# Patient Record
Sex: FEMALE
[De-identification: names, ages, dates, MRNs, and addresses within clinical notes are randomized per-mention and may not be internally consistent; named-entity substitution may affect disease eponyms.]

---

## 2018-03-30 ENCOUNTER — HOSPITAL ENCOUNTER (INPATIENT)
Dept: HOSPITAL 97 - ER | Age: 83
LOS: 5 days | Discharge: NURSING, REHAB OR LONG-TERM CARE | DRG: 683 | End: 2018-04-04
Attending: FAMILY MEDICINE | Admitting: FAMILY MEDICINE
Payer: COMMERCIAL

## 2018-03-30 VITALS — BODY MASS INDEX: 19.1 KG/M2

## 2018-03-30 DIAGNOSIS — K57.10: ICD-10-CM

## 2018-03-30 DIAGNOSIS — E87.2: ICD-10-CM

## 2018-03-30 DIAGNOSIS — K29.70: ICD-10-CM

## 2018-03-30 DIAGNOSIS — K21.9: ICD-10-CM

## 2018-03-30 DIAGNOSIS — E87.6: ICD-10-CM

## 2018-03-30 DIAGNOSIS — K29.80: ICD-10-CM

## 2018-03-30 DIAGNOSIS — N39.0: ICD-10-CM

## 2018-03-30 DIAGNOSIS — E46: ICD-10-CM

## 2018-03-30 DIAGNOSIS — D62: ICD-10-CM

## 2018-03-30 DIAGNOSIS — Z85.3: ICD-10-CM

## 2018-03-30 DIAGNOSIS — K20.9: ICD-10-CM

## 2018-03-30 DIAGNOSIS — R62.7: ICD-10-CM

## 2018-03-30 DIAGNOSIS — F41.9: ICD-10-CM

## 2018-03-30 DIAGNOSIS — K26.9: ICD-10-CM

## 2018-03-30 DIAGNOSIS — E03.9: ICD-10-CM

## 2018-03-30 DIAGNOSIS — E83.42: ICD-10-CM

## 2018-03-30 DIAGNOSIS — R04.0: ICD-10-CM

## 2018-03-30 DIAGNOSIS — I10: ICD-10-CM

## 2018-03-30 DIAGNOSIS — R55: ICD-10-CM

## 2018-03-30 DIAGNOSIS — K52.9: ICD-10-CM

## 2018-03-30 DIAGNOSIS — N17.9: Primary | ICD-10-CM

## 2018-03-30 LAB
ALBUMIN SERPL BCP-MCNC: 3.7 G/DL (ref 3.2–5.5)
ALP SERPL-CCNC: 101 IU/L (ref 42–121)
ALT SERPL W P-5'-P-CCNC: 12 IU/L (ref 10–60)
AST SERPL W P-5'-P-CCNC: 22 IU/L (ref 10–42)
BUN BLD-MCNC: 37 MG/DL (ref 6–20)
GLUCOSE SERPLBLD-MCNC: 157 MG/DL (ref 65–120)
HCT VFR BLD CALC: 31.9 % (ref 36–45)
INR BLD: 1.08
LYMPHOCYTES # SPEC AUTO: 3.9 K/UL (ref 0.7–4.9)
MAGNESIUM SERPL-MCNC: 2.3 MG/DL (ref 1.8–2.5)
MCH RBC QN AUTO: 27.3 PG (ref 27–35)
MCV RBC: 81.9 FL (ref 80–100)
PMV BLD: 7.9 FL (ref 7.6–11.3)
POTASSIUM SERPL-SCNC: 4.4 MEQ/L (ref 3.6–5)
RBC # BLD: 3.9 M/UL (ref 3.86–4.86)
UA COMPLETE W REFLEX CULTURE PNL UR: (no result)
UA DIPSTICK W REFLEX MICRO PNL UR: (no result)

## 2018-03-30 PROCEDURE — 94760 N-INVAS EAR/PLS OXIMETRY 1: CPT

## 2018-03-30 PROCEDURE — 86901 BLOOD TYPING SEROLOGIC RH(D): CPT

## 2018-03-30 PROCEDURE — 87186 SC STD MICRODIL/AGAR DIL: CPT

## 2018-03-30 PROCEDURE — 71260 CT THORAX DX C+: CPT

## 2018-03-30 PROCEDURE — 82962 GLUCOSE BLOOD TEST: CPT

## 2018-03-30 PROCEDURE — 81003 URINALYSIS AUTO W/O SCOPE: CPT

## 2018-03-30 PROCEDURE — 86900 BLOOD TYPING SEROLOGIC ABO: CPT

## 2018-03-30 PROCEDURE — 85025 COMPLETE CBC W/AUTO DIFF WBC: CPT

## 2018-03-30 PROCEDURE — 80053 COMPREHEN METABOLIC PANEL: CPT

## 2018-03-30 PROCEDURE — 97163 PT EVAL HIGH COMPLEX 45 MIN: CPT

## 2018-03-30 PROCEDURE — 70450 CT HEAD/BRAIN W/O DYE: CPT

## 2018-03-30 PROCEDURE — 85610 PROTHROMBIN TIME: CPT

## 2018-03-30 PROCEDURE — 87088 URINE BACTERIA CULTURE: CPT

## 2018-03-30 PROCEDURE — 87086 URINE CULTURE/COLONY COUNT: CPT

## 2018-03-30 PROCEDURE — 80048 BASIC METABOLIC PNL TOTAL CA: CPT

## 2018-03-30 PROCEDURE — 83880 ASSAY OF NATRIURETIC PEPTIDE: CPT

## 2018-03-30 PROCEDURE — 85014 HEMATOCRIT: CPT

## 2018-03-30 PROCEDURE — 71045 X-RAY EXAM CHEST 1 VIEW: CPT

## 2018-03-30 PROCEDURE — 88305 TISSUE EXAM BY PATHOLOGIST: CPT

## 2018-03-30 PROCEDURE — 84484 ASSAY OF TROPONIN QUANT: CPT

## 2018-03-30 PROCEDURE — 81015 MICROSCOPIC EXAM OF URINE: CPT

## 2018-03-30 PROCEDURE — 84132 ASSAY OF SERUM POTASSIUM: CPT

## 2018-03-30 PROCEDURE — 93005 ELECTROCARDIOGRAM TRACING: CPT

## 2018-03-30 PROCEDURE — 80076 HEPATIC FUNCTION PANEL: CPT

## 2018-03-30 PROCEDURE — 82274 ASSAY TEST FOR BLOOD FECAL: CPT

## 2018-03-30 PROCEDURE — 74177 CT ABD & PELVIS W/CONTRAST: CPT

## 2018-03-30 PROCEDURE — 86850 RBC ANTIBODY SCREEN: CPT

## 2018-03-30 PROCEDURE — 99285 EMERGENCY DEPT VISIT HI MDM: CPT

## 2018-03-30 PROCEDURE — 85730 THROMBOPLASTIN TIME PARTIAL: CPT

## 2018-03-30 PROCEDURE — 83735 ASSAY OF MAGNESIUM: CPT

## 2018-03-30 PROCEDURE — 87077 CULTURE AEROBIC IDENTIFY: CPT

## 2018-03-30 PROCEDURE — 85018 HEMOGLOBIN: CPT

## 2018-03-30 PROCEDURE — 72125 CT NECK SPINE W/O DYE: CPT

## 2018-03-30 PROCEDURE — 93880 EXTRACRANIAL BILAT STUDY: CPT

## 2018-03-30 PROCEDURE — 36415 COLL VENOUS BLD VENIPUNCTURE: CPT

## 2018-03-30 PROCEDURE — 88312 SPECIAL STAINS GROUP 1: CPT

## 2018-03-30 RX ADMIN — Medication SCH: at 21:00

## 2018-03-30 RX ADMIN — SODIUM CHLORIDE SCH: 0.9 INJECTION, SOLUTION INTRAVENOUS at 23:00

## 2018-03-30 RX ADMIN — SODIUM CHLORIDE SCH MLS: 0.9 INJECTION, SOLUTION INTRAVENOUS at 13:14

## 2018-03-30 RX ADMIN — METOPROLOL TARTRATE SCH MG: 50 TABLET, FILM COATED ORAL at 21:39

## 2018-03-30 NOTE — RAD REPORT
EXAM DESCRIPTION:  CT - [id number] - 3/30/2018 9:28 am

 

CLINICAL HISTORY:  Syncope, fall, head and neck injury

 

COMPARISON:  CT head and neck September 2017

 

TECHNIQUE:  Axial 5 mm thick images of the head were obtained.  Axial 2 mm thick images of the cervic
al spine were obtained with sagittal and coronal reconstruction images generated and reviewed.

 

All CT scans are performed using dose optimization technique as appropriate and may include automated
 exposure control or mA/KV adjustment according to patient size.

 

FINDINGS:

No intracranial hemorrhage, mass, edema or acute intracranial finding. No acute cortical based infarc
tion. Advanced atrophy and chronic ischemic changes are present. Ventricular size is in proportion. A
rterial and physiologic calcifications are present. No extra-axial fluid collections. Mastoid air kunal
ls are clear. Chronic right-sided sphenoid sinusitis stable from prior imaging. Remaining paranasal s
inuses as imaged are unremarkable. No globe or orbit abnormality seen.

 

Cervical bodies are normal in height. There is a slight retrolisthesis of C4 relative to C2 and C3. M
ild facet joint degenerative changes are present. No pathologic or posttraumatic etiology for the sub
luxation. C4-5 disc space narrowing is present. No other significant disc space narrowing. No fractur
e or acute bony abnormality. Central canal detail is inherently limited.

 

No paraspinal mass or hematoma.

 

IMPRESSION:  Patient has advanced atrophy and chronic ischemic change similar to the prior study. No 
acute intracranial finding.

 

Mild for age cervical spine degenerative change with no acute finding. Central canal detail is inhere
ntly limited.

 

Chronic sphenoid sinusitis.

## 2018-03-30 NOTE — ER
Nurse's Notes                                                                                     

 [facility]                                                                

Name: Delia Stewart                                                                               

Age: 86 yrs                                                                                       

Sex: Female                                                                                       

: 1931                                                                                   

MRN: [id number]                                                                                   

Arrival Date: 2018                                                                          

Time: 09:04                                                                                       

Account#: [id number]                                                                            

Bed 4                                                                                             

Private MD:                                                                                       

Diagnosis: Syncope and collapse;Weakness;Medication Non-Compliance                                

                                                                                                  

Presentation:                                                                                     

                                                                                             

09:05 Presenting complaint: EMS states: Home Health nurse reports that pt had syncopal        ph  

      episode in restroom lasting approx 2 min. BP 98 systolic for EMS, pt denies pain or         

      injury r/t fall, 12 lead EKG normal, dried blood noted to pt's face, pt reports that it     

      is from nose bleed. Transition of care: patient was not received from another setting       

      of care. Onset of symptoms was 2018. Care prior to arrival: None.                 

09:05 Method Of Arrival: EMS: [facility]                                                ph  

09:05 Acuity: AMAN 3                                                                           ph  

                                                                                                  

Historical:                                                                                       

- Allergies:                                                                                      

09:10 Ferrous Sulfate;                                                                        ph  

09:10 Opioids - Morphine Analogues;                                                           ph  

09:10 Opioids-Meperidine \T\ Related;                                                           ph

09:10 Opioids-Methadone \T\ Related;                                                            ph

09:10 Nefazodone;                                                                             ph  

09:10 Propoxyphene HCl;                                                                       ph  

09:10 Trazodone;                                                                              ph  

- Home Meds:                                                                                      

09:10 amlodipine 2.5 mg tab 1 tab once daily [Active]; doxepin 50 mg Oral cap 1 cap once      ph  

      daily [Active]; levothyroxine 100 mcg tab once daily [Active]; lorazepam 1 mg Oral tab      

      1 1TO 2 TABS as needed [Active]; losartan 50 mg Oral tab 1 tab once daily [Active];         

      metoprolol succinate 50 mg Oral Tb24 twice a day [Active]; Norco 5-325 mg Oral tab          

      twice a day [Active]; pantoprazole 40 mg Oral TbEC 1 tab once daily [Active];               

      paroxetine HCl 40 mg Oral tab 1 tab once daily [Active]; Tylenol #3 Oral [Active];          

- PMHx:                                                                                           

09:10 Cancer, Breast; GERD; Hypertension; Hypothyroidism;                                     ph  

                                                                                                  

- Immunization history:: Adult Immunizations unknown.                                             

- Social history:: Smoking status: Patient/guardian denies using tobacco.                         

                                                                                                  

                                                                                                  

Screenin:11 Abuse screen: Denies threats or abuse. Denies injuries from another. Nutritional        ph  

      screening: No deficits noted. Tuberculosis screening: No symptoms or risk factors           

      identified. Fall Risk Fall in past 12 months (25 points). No secondary diagnosis (0         

      pts). IV access (20 points). Ambulatory Aid- None/Bed Rest/Nurse Assist (0 pts). Gait-      

      Weak (10 pts.). Mental Status- Oriented to own ability (0 pts). Total Bright Fall Scale      

      indicates High Risk Score (45 or more points). Fall prevention measures have been           

      instituted. Side Rails Up X 2 Placed Close to Nursing Station Frequent Obs/Assessments      

      Occuring As available patient and family educated on Fall Prevention Program and            

      Strategies.                                                                                 

                                                                                                  

Assessment:                                                                                       

09:12 General: Appears in no apparent distress. comfortable, slender, Behavior is calm,       ph  

      cooperative, appropriate for age, Denies fever, feeling ill. Pain: Denies pain. Neuro:      

      Level of Consciousness is awake, alert, obeys commands, Oriented to person, place,          

      situation, Speech is normal, Pupils are PERRLA. Cardiovascular: Reports                     

      lightheadedness, syncope, Denies chest pain, shortness of breath, Capillary refill < 3      

      seconds Patient's skin is warm and dry. Respiratory: Airway is patent Respiratory           

      effort is even, unlabored, Respiratory pattern is regular, symmetrical. Derm: Skin is       

      fragile, is thin, Skin is pink, warm \T\ dry. Musculoskeletal: Circulation, motion, and     

      sensation intact. Range of motion: intact in all extremities.                               

09:47 Reassessment: Patient appears in no apparent distress at this time. Neuro: Level of     la1 

      Consciousness is awake, alert, obeys commands, Oriented to person, place, situation.        

      Cardiovascular: Heart tones S1 S2 present Rhythm is sinus rhythm. GI: No signs and/or       

      symptoms were reported involving the gastrointestinal system. : No signs and/or           

      symptoms were reported regarding the genitourinary system.                                  

11:00 Reassessment: Patient appears in no apparent distress at this time. No changes from     la1 

      previously documented assessment. Patient and/or family updated on plan of care and         

      expected duration. Pain level reassessed.                                                   

12:46 Reassessment:.                                                                          la1 

                                                                                                  

Vital Signs:                                                                                      

09:08  / 95; Pulse 88; Resp 18; Temp 97.3; Pulse Ox 100% on R/A; Weight 43.09 kg; Pain  ph  

      0/10;                                                                                       

09:47  / 87; Pulse 76; Resp 16; Pulse Ox 98% on R/A;                                    la1 

10:49  / 101; Pulse 83; Resp 20; Pulse Ox 98% on R/A;                                   la1 

11:27  / 76; Pulse 80; Resp 16; Pulse Ox 95% on R/A;                                    la1 

12:23  / 63; Pulse 86; Resp 19; Pulse Ox 100% on R/A;                                   la1 

                                                                                                  

ED Course:                                                                                        

09:04 Patient arrived in ED.                                                                  ph  

09:05 Vinay Godwin MD is Attending Physician.                                              kdr 

09:07 Triage completed.                                                                       ph  

09:12 Arm band placed on.                                                                     ph  

09:12 Patient has correct armband on for positive identification. Placed in gown. Bed in low  ph  

      position. Call light in reach. Side rails up X 1. Cardiac monitor on. Pulse ox on. NIBP     

      on. Warm blanket given.                                                                     

09:14 Inserted saline lock: 22 gauge in left antecubital area, using aseptic technique.       ph  

09:27 CT Head C Spine In Process Unspecified.                                                 EDMS

09:46 Rivera Bajwa, RN is Primary Nurse.                                                       la1 

09:52 EKG done, by EKG tech. reviewed by Vinay Godwin MD.                                    at1 

10:05 XRAY Chest (1 view) In Process Unspecified.                                             EDMS

11:24 Wang Colvin MD is Hospitalizing Provider.                                            kdr 

12:46 No provider procedures requiring assistance completed. Patient admitted, IV remains in  la1 

      place.                                                                                      

                                                                                                  

Administered Medications:                                                                         

No medications were administered                                                                  

                                                                                                  

                                                                                                  

Outcome:                                                                                          

11:42 Decision to Hospitalize by Provider.                                                    kdr 

12:46 Admitted to Tele accompanied by tech, via stretcher, room 421, with chart, Report       la1 

      called to  Isai                                                                            

12:46 Condition: stable                                                                           

12:46 Instructed on the need for admit.                                                           

12:49 Patient left the ED.                                                                    la1 

                                                                                                  

Signatures:                                                                                       

Dispatcher [employer]                           EDMS                                                 

Vinay Godwin MD MD   kdr                                                  

Danielle wrae, EKG Tech              EKG Tat1                                                  

Rivera Bajwa, DAE                         RN   la1                                                  

Liss Myrick RN                      RN   ph                                                   

                                                                                                  

Corrections: (The following items were deleted from the chart)                                    

12:08 09:12 Neuro: Level of Consciousness is awake, alert, obeys commands, Oriented to        ph  

      person, place, time, situation, Speech is normal, Pupils are PERRLA, ph                     

                                                                                                  

**************************************************************************************************

## 2018-03-30 NOTE — EDPHYS
Physician Documentation                                                                           

 [facility]                                                                

Name: Delia Stewart                                                                               

Age: 86 yrs                                                                                       

Sex: Female                                                                                       

: 1931                                                                                   

MRN: [id number]                                                                                   

Arrival Date: 2018                                                                          

Time: 09:04                                                                                       

Account#: [id number]                                                                            

Bed 4                                                                                             

Private MD:                                                                                       

ED Physician Vinay Godwin                                                                       

HPI:                                                                                              

                                                                                             

15:50 This 86 yrs old  Female presents to ER via EMS with complaints of Syncope.      kdr 

15:50 The patient has experienced syncope, became unresponsive, collapsed. Onset: The         kdr 

      symptoms/episode began/occurred suddenly, just prior to arrival. Duration: This was a       

      single episode, that lasted 2 minute(s). Context: the episode(s) was witnessed, by a        

      friend, occurred occurred while the patient was Sitting on toilet. Associated injury:       

      The patient did not suffer any apparent associated injury. Associated signs and             

      symptoms: The patient has no apparent associated signs or symptoms. Current symptoms:       

      Currently, the patient is not experiencing any symptoms. It is unknown whether or not       

      the patient has had similar symptoms in the past. The patient has not recently seen a       

      physician. The patient is reported by caregiver to have periods of weakness and near        

      syncope. It is thought that she may be getting up at night and taking her medications       

      as the family reports that the patient is consuming her medications faster than what        

      would be anticipated given the orders.                                                      

                                                                                                  

Historical:                                                                                       

- Allergies:                                                                                      

09:10 Ferrous Sulfate;                                                                        ph  

09:10 Opioids - Morphine Analogues;                                                           ph  

09:10 Opioids-Meperidine \T\ Related;                                                           ph

09:10 Opioids-Methadone \T\ Related;                                                            ph

09:10 Nefazodone;                                                                             ph  

09:10 Propoxyphene HCl;                                                                       ph  

09:10 Trazodone;                                                                              ph  

- Home Meds:                                                                                      

09:10 amlodipine 2.5 mg tab 1 tab once daily [Active]; doxepin 50 mg Oral cap 1 cap once      ph  

      daily [Active]; levothyroxine 100 mcg tab once daily [Active]; lorazepam 1 mg Oral tab      

      1 1TO 2 TABS as needed [Active]; losartan 50 mg Oral tab 1 tab once daily [Active];         

      metoprolol succinate 50 mg Oral Tb24 twice a day [Active]; Norco 5-325 mg Oral tab          

      twice a day [Active]; pantoprazole 40 mg Oral TbEC 1 tab once daily [Active];               

      paroxetine HCl 40 mg Oral tab 1 tab once daily [Active]; Tylenol #3 Oral [Active];          

- PMHx:                                                                                           

09:10 Cancer, Breast; GERD; Hypertension; Hypothyroidism;                                     ph  

                                                                                                  

- Immunization history:: Adult Immunizations unknown.                                             

- Social history:: Smoking status: Patient/guardian denies using tobacco.                         

                                                                                                  

                                                                                                  

ROS:                                                                                              

15:50 Constitutional: Negative for fever, chills, and weight loss, Eyes: Negative for injury, kdr 

      pain, redness, and discharge, Neck: Negative for injury, pain, and swelling,                

      Cardiovascular: Negative for chest pain, palpitations, and edema, Respiratory: Negative     

      for shortness of breath, cough, wheezing, and pleuritic chest pain, Abdomen/GI:             

      Negative for abdominal pain, nausea, vomiting, diarrhea, and constipation, Back:            

      Negative for injury and pain, MS/Extremity: Negative for injury and deformity, Skin:        

      Negative for injury, rash, and discoloration, Neuro: Negative for headache, weakness,       

      numbness, tingling, and seizure activity. Psych: Negative for depression, anxiety,          

      suicide ideation, homicidal ideation, and hallucinations, Allergy/Immunology: Negative      

      for hives, rash, and allergies, Endocrine: Negative for neck swelling, polydipsia,          

      polyuria, polyphagia, and marked weight changes, Hematologic/Lymphatic: Negative for        

      swollen nodes, abnormal bleeding, and unusual bruising.                                     

15:50 ENT: Positive for nose bleed, There is blood around her mouth and nose.                     

                                                                                                  

Exam:                                                                                             

15:50 Constitutional:  This is a well developed, well nourished patient who is awake, alert,  kdr 

      and in no acute distress.  She is frail appearing and mildly malnourished. Head/Face:       

      Normocephalic, atraumatic. Eyes:  Pupils equal round and reactive to light,                 

      extra-ocular motions intact.  Lids and lashes normal.  Conjunctiva and sclera are           

      non-icteric and not injected.  Cornea within normal limits.  Periorbital areas with no      

      swelling, redness, or edema. Neck:  Trachea midline, no thyromegaly or masses palpated,     

      and no cervical lymphadenopathy.  Supple, full range of motion without nuchal rigidity,     

      or vertebral point tenderness.  No Meningismus. Chest/axilla:  Normal chest wall            

      appearance and motion.  Nontender with no deformity.  No lesions are appreciated.           

      Cardiovascular:  Regular rate and rhythm with a normal S1 and S2.  No gallops, murmurs,     

      or rubs.  Normal PMI, no JVD.  No pulse deficits. Respiratory:  Lungs have equal breath     

      sounds bilaterally, clear to auscultation and percussion.  No rales, rhonchi or wheezes     

      noted.  No increased work of breathing, no retractions or nasal flaring. Abdomen/GI:        

      Soft, non-tender, with normal bowel sounds.  No distension or tympany.  No guarding or      

      rebound.  No evidence of tenderness throughout. Back:  No spinal tenderness.  No            

      costovertebral tenderness.  Full range of motion. Skin:  Warm, dry with normal turgor.      

      Normal color with no rashes, no lesions, and no evidence of cellulitis. MS/ Extremity:      

      Pulses equal, no cyanosis.  Neurovascular intact.  Full, normal range of motion. Neuro:     

       Awake and alert, GCS 15, oriented to person, place, time, and situation.  Cranial          

      nerves II-XII grossly intact.  Motor strength 5/5 in all extremities.  Sensory grossly      

      intact.  Cerebellar exam grossly normal.  Psych:  Awake, alert, with orientation to         

      person, place and time.  Behavior, mood, and affect are within normal limits.               

                                                                                                  

Vital Signs:                                                                                      

09:08  / 95; Pulse 88; Resp 18; Temp 97.3; Pulse Ox 100% on R/A; Weight 43.09 kg; Pain  ph  

      0/10;                                                                                       

09:47  / 87; Pulse 76; Resp 16; Pulse Ox 98% on R/A;                                    la1 

10:49  / 101; Pulse 83; Resp 20; Pulse Ox 98% on R/A;                                   la1 

11:27  / 76; Pulse 80; Resp 16; Pulse Ox 95% on R/A;                                    la1 

12:23  / 63; Pulse 86; Resp 19; Pulse Ox 100% on R/A;                                   la1 

                                                                                                  

MDM:                                                                                              

11:42 Patient medically screened.                                                             kdr 

15:50 Data reviewed: vital signs, nurses notes, lab test result(s), EKG, radiologic studies.  kdr 

      Counseling: I had a detailed discussion with the patient and/or guardian regarding: the     

      historical points, exam findings, and any diagnostic results supporting the                 

      discharge/admit diagnosis, lab results, radiology results, the need for further work-up     

      and treatment in the hospital. Physician consultation: Wang Colvin MD.                     

                                                                                                  

                                                                                             

09:06 Order name: LFT's; Complete Time: 11:21                                                 kdr 

                                                                                             

09:06 Order name: Basic Metabolic Panel; Complete Time: 11:                                 kdr 

                                                                                             

09:06 Order name: BNP; Complete Time: 11:                                                   kdr 

                                                                                             

09:06 Order name: CBC with Diff; Complete Time: 11:                                         kdr 

                                                                                             

09:06 Order name: Magnesium; Complete Time: 11:                                             kdr 

                                                                                             

09:06 Order name: PT-INR; Complete Time: 11:                                                kdr 

                                                                                             

09:06 Order name: Ptt, Activated; Complete Time: :                                        kdr 

                                                                                             

09:06 Order name: Troponin (emerg Dept Use Only); Complete Time: 11:                        kdr 

                                                                                             

09:06 Order name: XRAY Chest (1 view); Complete Time: :                                   kdr 

                                                                                             

09:06 Order name: EKG; Complete Time: 09:                                                   kdr 

                                                                                             

09:06 Order name: Cardiac monitoring; Complete Time: 09:                                    kdr 

                                                                                             

09:06 Order name: EKG - Nurse/Tech; Complete Time: :                                      [facility] 

                                                                                             

09:06 Order name: IV Saline Lock; Complete Time: :                                        kdr 

                                                                                             

09:06 Order name: CT Head C Spine; Complete Time: 11:                                       kdr 

                                                                                             

09:06 Order name: Labs collected and sent; Complete Time: :                               kdr 

                                                                                             

09:06 Order name: O2 Per Protocol; Complete Time: :                                       kdr 

                                                                                             

09:06 Order name: O2 Sat Monitoring; Complete Time: 09:14                                     kdr 

                                                                                                  

Administered Medications:                                                                         

No medications were administered                                                                  

                                                                                                  

                                                                                                  

Disposition:                                                                                      

18 11:42 Hospitalization ordered by Wang Colvin for Observation. Preliminary diagnosis    

  are Syncope and collapse, Weakness, Medication Non-Compliance.                                  

- Bed requested for Telemetry/MedSurg (observation).                                              

- Status is Observation.                                                                      la1 

- Condition is Fair.                                                                              

- Problem is new.                                                                                 

- Symptoms have improved.                                                                         

UTI on Admission? Yes                                                                             

                                                                                                  

                                                                                                  

                                                                                                  

Signatures:                                                                                       

Dispatcher [employer]                           Sharyn Guajardo RN                        DAE                                                      

Vinay Godwin MD MD   [facility]                                                  

Rivera Bajwa RN RN   la1                                                  

Liss Myrick RN                      RN   ph                                                   

                                                                                                  

**************************************************************************************************

## 2018-03-30 NOTE — RAD REPORT
EXAM DESCRIPTION:  [facility] - CP - 3/30/2018 5:01 pm

 

CLINICAL HISTORY:  Syncope

 

COMPARISON:  None.

 

TECHNIQUE:  Real-time sonographic evaluation of both carotid systems was performed. Doppler interroga
tion was performed with waveform tracing bilaterally.

 

FINDINGS:  Normal high resistance waveforms are noted in both external carotid arteries. The common c
arotid arteries and internal carotid arteries show normal low resistance waveforms.

 

Mild mixed plaque formation is seen in both proximal ICAs. Peak systolic and end diastolic velocity v
alues and the ICA/CCA ratios are in the non-hemodynamically significant range.

 

Antegrade flow seen in both vertebral arteries.

 

IMPRESSION:  Mild mixed plaquing is present in both proximal ICAs.

 

No evidence of a hemodynamically significant stenosis.

## 2018-03-30 NOTE — RAD REPORT
EXAM DESCRIPTION:  RAD - Chest Single View - 3/30/2018 10:05 am

 

CLINICAL HISTORY:  Syncope, shortness of breath

 

COMPARISON:  September 2017

 

TECHNIQUE:  AP portable chest image was obtained 0914 hours .

 

FINDINGS:  Fibrotic lung pattern is stable. No failure, infiltrate or acute lung parenchymal process.
 Heart and vasculature are normal. No measurable pleural effusion and no pneumothorax. No acute bone 
finding. Old trauma changes to the proximal right humerus noted. Surgical clips are seen in the right
 axilla. No acute aortic findings suspected.

 

IMPRESSION:  No acute cardiopulmonary process.

 

Fibrotic lung pattern is stable from comparison.

## 2018-03-30 NOTE — HP
Date of Admission:  03/30/2018



Chief Complaint:  Syncopal episode.



Code Status:  DNR.



Primary Care Physician:  Dr. Morales.



History Of Present Illness:  The patient is an 86-year-old female with past 
medical history of hypothyroidism, gastroesophageal reflux disease, history of 
breast cancer, anxiety, hypertension, who was in her usual state of health 
until the day prior to admission when the patient noticed significant epistaxis 
after her home health care nurse had left for the day.  The patient denies any 
dizziness or lightheadedness.  The patient was found this morning by her home 
health care worker to have nosebleed with blood on the sheets.  The patient 
contacted 911 and the patient was found to have a syncopal episode while using 
the restroom.  The patient was in the presence of a caregiver and therefore did 
not have any fall, did not hit her head.  The patient's syncopal episode lasted 
only for a few seconds.  There was no seizure-type activity.  The patient has 
not had a recurrent nosebleed since the day prior to admission.  The patient's 
symptoms are constant, moderate, and progressively worsening.  She does not 
take any blood thinners.  The son reports that she has been taking her blood 
pressure medication perhaps more than usual and is somewhat confused.  Also 
reports a 20 pounds weight loss over the past couple of years.  The patient 
also has decreased appetite contributing to her symptoms.  Upon arrival to the 
ER, her vital signs were stable.  She was afebrile.  Her workup reveals a 
normal white count, slightly elevated creatinine.  Hemoglobin was 10.6, which 
is around her baseline.  The patient was referred for admission.  When seen in 
the ER, she was awake, alert, oriented x3, in some mild distress.



Past Medical History:  Hypertension, anxiety disorder, history of breast cancer
, gastroesophageal reflux disease, and hypothyroidism.



Past Surgical History:  Appendectomy, partial stomach removal for ulcers.



Medications:  Reviewed.



Allergies:  TO NEFAZODONE CAUSES NAUSEA, VOMITING.  MORPHINE CAUSES NAUSEA, 
VOMITING.  OPIOIDS AND MEPERIDINE, NAUSEA AND VOMITING.  METHADONE, NAUSEA AND 
VOMITING.  PROPOXYPHENE, TRAZODONE ALSO CAUSE NAUSEA AND VOMITING.  FERROUS 
SULFATE CAUSES RASH.



Social History:  The patient lives alone, does have home health care.  The 
patient is , has good social support.  Has a son.  The patient denies 
any smoking, alcohol use, or illicit drug use.



Family History:  Noncontributory in this 86-year-old female.



Review of Systems:

An 11-point system reviewed, negative except as per HPI.



Physical Examination:

Vital Signs:  Temperature 97.3, heart rate 88, blood pressure 159/95, 
respirations 18, O2 100% on room air. 

General:  Awake, alert, oriented x3, in some mild distress, ill-appearing 
elderly female, cachectic. 

HEENT:  Normocephalic and atraumatic.  PERRLA.  EOMI.  Oropharynx has some 
dried blood on the lips.  Oropharynx is clear.  Dentition is poor.  Conjunctiva 
anicteric.  Nares also have some drying blood.  No swollen turbinates. 

Neck:  Supple.  No JVD.  Trachea midline. 

CV:  S1, S2.  Regular rate and rhythm.  Peripheral pulses are present 
bilaterally.  No murmurs. 

Respiratory:  Clear to auscultation bilaterally.  No wheezing.  No stridor.  No 
use of accessory muscles. 

Gastrointestinal:  Abdomen is soft, nontender, nondistended.  Positive bowel 
sounds.  No guarding or rigidity.  No palpable masses. 

Extremities:  No clubbing, cyanosis, or edema.  No calf tenderness. 

Neuro:  Cranial nerves 2 through 12 intact grossly.  A 5/5 bilateral upper and 
lower extremity strength.  Sensation intact to light touch.  Speech is normal. 

Skin:  No rashes.  Normal skin turgor. 

Psych:  Mood is okay.  Affect is full.  Insight and judgment are fair.



Laboratory Data:  WBC 10.8, H and H 10.6 and 31.9, platelets 329, neutrophils 51
%.  INR 1.08.  Sodium 139, potassium 4.4, chloride 113, CO2 22, BUN 37, 
creatinine 1.3, glucose 157, calcium 9.1, magnesium 2.3.  .  Troponin 
less than 0.03.  Albumin 3.7.



Imaging:  Chest x-ray shows no acute cardiopulmonary process.  Fibrotic lung 
pattern is stable from comparison.  CT of head and neck, cervical spine shows 
advanced atrophy and chronic ischemic changes similar to prior study.  No acute 
intracranial finding, mild for age.  Cervical spine, degenerative change with 
no acute findings.  Central canal detail is inherently limited.  Chronic 
sphenoid sinusitis.



Assessment And Plan:  An 86-year-old female with;

1.   Syncopal episode, likely vasovagal.  We will obtain orthostatic vital signs
, carotid ultrasound.  We will place on fall precautions.  We will obtain a 
physical therapy evaluation.

2.   Epistaxis, unclear etiology.  The patient is not on any blood thinners.  
No recent trauma.

3.   Essential hypertension.  The patient has been taking her medications more 
than prescribed due to confusion.  We will monitor blood pressure closely.

4.   Generalized anxiety disorder, stable.

5.   History of breast cancer.

6.   Gastroesophageal reflux disease.  Continue PPI.

7.   Hypothyroidism.  We will check TSH and continue on thyroid replacement.

8.   Failure to thrive.  The patient is cachectic appearing, has lost 20 pounds 
over the past couple of years.  Has a poor appetite.  We will order nutrition 
and dietary consult.  We will supplement with protein shakes.

9.   Gastrointestinal and deep venous thrombosis prophylaxis with PPI and SCDs.
  No chemical anticoagulation due to bleed.



Plan:  Admit the patient to Med-Surg, place as observation.  We will attempt to 
consult ENT if available.

Patient does have medical power of  and living will





PABLO

DD:  03/30/2018 12:25:56   Voice ID:  [id number]

CLARENCE

## 2018-03-30 NOTE — XMS REPORT
Clinical Summary

 Created on:2018



Patient:Norberto Stewart

Sex:Female

:1931

External Reference #:[id number]





Demographics







 Address  [address] [place] [address]

 

 Home Phone  [phone]

 

 Mobile Phone  [phone]

 

 Email Address  [email or web address]

 

 Preferred Language  English

 

 Marital Status  

 

 Gnosticist Affiliation  Unknown

 

 Race  White

 

 Ethnic Group  Not  or 









Author







 Organization  [facility]

 

 Address  [address] [place] [id number]









Support







 Name  Relationship  Address  Phone

 

 Frank Stewart  Child  Unavailable  [phone]









Care Team Providers







 Name  Role  Phone

 

 Asked, No Pcp  Primary Care Provider  Unavailable









Allergies

Not on File



Current Medications







 Prescription  Sig.  Disp.  Refills  Start Date  End Date  Status

 

 traMADol (ULTRAM)  Take 1 tablet  40 tablet  0  2017  



 50 mg tablet  (50 mg total)          



   by mouth every          



   6 (six) hours          



   as needed for          



   moderate pain          



   for up to 10          



   days.          

 

 zolpidem (AMBIEN)  Take 0.5  40 tablet  0  2017  10/25/2017  Discontinued



 10 mg tablet  tablets (5 mg          



   total) by          



   mouth nightly          



   as needed for          



   sleep for up          



   to 40 days.          

 

 zolpidem (AMBIEN) 5  Take 1 tablet  30 tablet  0  10/25/2017  2017  




 MG tablet  (5 mg total)          



   by mouth          



   nightly as          



   needed for          



   sleep for up          



   to 30 days.          







Active Problems







 Problem  Noted Date

 

 Age-related osteoporosis with current pathological fracture  10/26/2017

 

 Thoracic compression fracture, closed, initial encounter  10/04/2017

 

 Acute low back pain  2017

 

 Closed displaced fracture of surgical neck of right humerus  2017

 

 Thoracic compression fracture  2017







Encounters







 Date  Type  Specialty  Care Team  Description

 

 2017  Orders Only  Access  Darrion Schwartz MD  

 

 10/25/2017  Office Visit  Orthopedic Surgery  Darrion Schwartz,  Closed displaced 
fracture of surgical neck of right humerus, unspecified fracture morphology, 
initial encounter (Primary Dx);



       MD  Thoracic compression fracture, closed, initial encounter;



         Age-related osteoporosis with current pathological fracture with 
routine healing, subsequent encounter

 

 10/25/2017  Telephone  Orthopedic Surgery  Damion,  Closed compression 
fracture of thoracic vertebra with routine healing, subsequent encounter (
Primary Dx);



       DAE Flores  Other closed displaced fracture of proximal end of right 
humerus with routine healing, subsequent encounter;



         Physical deconditioning

 

 10/16/2017  Telephone  Orthopedic Surgery  Sandra Bruno RN  

 

 10/16/2017  Telephone  Orthopedic Surgery  Cinda Schwartz RN  

 

 10/04/2017  Office Visit  Orthopedic Surgery  Darrion Schwartz,  Closed displaced 
fracture of surgical neck of right humerus, unspecified fracture morphology, 
initial encounter (Primary Dx);



       MD  Thoracic compression fracture, closed, initial encounter;



         Physical deconditioning

 

 2017  Hospital Encounter  Darrion Lira MD  

 

 2017  Hospital Encounter  Darrion Lira MD  

 

 2017  Hospital Encounter  Darrion Lira MD  

 

 2017  Hospital Encounter  Darrion Lira MD  

 

 2017  Hospital Encounter  Darrion Lira MD  

 

 2017  Hospital Encounter  Darrion Lira MD  

 

 2017  Hospital Encounter  Darrion Lira MD  

 

 2017  Office Visit  Orthopedic Surgery  Darrion Schwartz,  Acute low back 
pain, unspecified back pain laterality, with sciatica presence unspecified (
Primary Dx);



       MD  Closed displaced fracture of surgical neck of right humerus, 
unspecified fracture morphology, initial encounter;



         Thoracic compression fracture, closed, initial encounter

 

 2017  Ancillary Orders  Darrion Lira MD  

 

 2017  Documentation  Orthopedic Surgery  Jennifer Yeh LPN  



after 2017



Social History







 Tobacco Use  Types  Packs/Day  Years Used  Date

 

 Never Assessed        









 Sex Assigned at Birth  Date Recorded

 

 Not on file  







Last Filed Vital Signs







 Vital Sign  Reading  Time Taken

 

 Blood Pressure  -  -

 

 Pulse  -  -

 

 Temperature  -  -

 

 Respiratory Rate  -  -

 

 Oxygen Saturation  -  -

 

 Inhaled Oxygen Concentration  -  -

 

 Weight  33.3 kg (73 lb 8 oz)  2017  3:06 PM CDT

 

 Height  147.3 cm (4' 10")  2017  3:06 PM CDT

 

 Body Mass Index  15.36  2017  3:06 PM CDT







Plan of Treatment







 Health Maintenance  Due Date  Last Done  Comments

 

 ZOSTER VACCINE  1991    

 

 PNEUMOCOCCAL POLYSACCHARIDE VACCINE AGE 65 AND OVER  1996    

 

 PNEUMOCOCCAL-13  1996    

 

 INFLUENZA VACCINE  2017    







Results

Miscellaneous Imaging Result (2017)





 Specimen  Performing Laboratory

 

 Blood  



XR Spine External Study (2017  6:25 AM)





 Specimen  Performing Laboratory

 

   [facility]







   [address] [place] [address]









 Narrative

 

 This exam was not acquired at a [facility] facility and has not been 







 interpreted by a [facility] Provider.The exam was imported into our 







 imaging system for comparisons purposes.



CT Spine External Study (09/10/2017  5:16 PM)Only the most recent of3 
resultswithin the time period is included.





 Specimen  Performing Laboratory

 

    [facility]







   [address] [place] [address]









 Narrative

 

 This exam was not acquired at a [facility] facility and has not been 







 interpreted by a [facility] Provider.The exam was imported into our 







 imaging system for comparisons purposes.



XR Upper Extremity External Study (09/10/2017  3:28 PM)Only the most recent of2 
resultswithin the time period is included.





 Specimen  Performing Laboratory

 

    [facility]







   [address] [place] [address]









 Narrative

 

 This exam was not acquired at a [facility] facility and has not been 







 interpreted by a [facility] Provider.The exam was imported into our 







 imaging system for comparisons purposes.



XR Chest External Study (09/10/2017  3:28 PM)





 Specimen  Performing Laboratory

 

    [facility]







   [address] [place] [address]









 Narrative

 

 This exam was not acquired at a [facility] facility and has not been 







 interpreted by a [facility] Provider.The exam was imported into our 







 imaging system for comparisons purposes.



after 2017



Insurance







 Payer  Benefit Plan / Group  Subscriber ID  Type  Phone  Address

 

 MEDICARE  MEDICARE PART A AND B  xxxxxxxxxx  Medicare    [place], [place]

 

 [employer]  GEHA MED SUPPLEMENT  xxxxxxxxxxxx  Commercial    









 Guarantor Name  Account Type  Relation to  Date of Birth  Phone  Billing



     Patient      Address

 

 NORBERTO STEWART  Personal/Family  Self  1931  Home:  [address] [place] [address]







         [phone]  [address] [address]

## 2018-03-31 LAB
ALBUMIN SERPL BCP-MCNC: 3.6 G/DL (ref 3.2–5.5)
ALP SERPL-CCNC: 90 IU/L (ref 42–121)
ALT SERPL W P-5'-P-CCNC: 14 IU/L (ref 10–60)
AST SERPL W P-5'-P-CCNC: 27 IU/L (ref 10–42)
BUN BLD-MCNC: 33 MG/DL (ref 6–20)
GLUCOSE SERPLBLD-MCNC: 98 MG/DL (ref 65–120)
HCT VFR BLD CALC: 24.4 % (ref 36–45)
HCT VFR BLD CALC: 26.6 % (ref 36–45)
HCT VFR BLD CALC: 30.2 % (ref 36–45)
INR BLD: 1.03
LYMPHOCYTES # SPEC AUTO: 2.8 K/UL (ref 0.7–4.9)
MCH RBC QN AUTO: 26.9 PG (ref 27–35)
MCV RBC: 83 FL (ref 80–100)
PMV BLD: 8 FL (ref 7.6–11.3)
POTASSIUM SERPL-SCNC: 4.2 MEQ/L (ref 3.6–5)
RBC # BLD: 3.63 M/UL (ref 3.86–4.86)

## 2018-03-31 RX ADMIN — ONDANSETRON PRN MG: 2 INJECTION INTRAMUSCULAR; INTRAVENOUS at 12:46

## 2018-03-31 RX ADMIN — METOPROLOL TARTRATE SCH MG: 50 TABLET, FILM COATED ORAL at 20:11

## 2018-03-31 RX ADMIN — Medication SCH: at 09:00

## 2018-03-31 RX ADMIN — METRONIDAZOLE SCH MLS: 500 INJECTION, SOLUTION INTRAVENOUS at 19:58

## 2018-03-31 RX ADMIN — SODIUM CHLORIDE SCH MLS: 0.9 INJECTION, SOLUTION INTRAVENOUS at 19:57

## 2018-03-31 RX ADMIN — Medication SCH: at 20:12

## 2018-03-31 RX ADMIN — PHYTONADIONE SCH MG: 10 INJECTION, EMULSION INTRAMUSCULAR; INTRAVENOUS; SUBCUTANEOUS at 23:13

## 2018-03-31 RX ADMIN — LEVOTHYROXINE SODIUM SCH MG: 0.1 TABLET ORAL at 05:34

## 2018-03-31 RX ADMIN — SODIUM CHLORIDE SCH MLS: 0.9 INJECTION, SOLUTION INTRAVENOUS at 20:11

## 2018-03-31 RX ADMIN — TEMAZEPAM PRN MG: 15 CAPSULE ORAL at 22:15

## 2018-03-31 RX ADMIN — LACTOBACILLUS TAB SCH TAB: TAB at 18:27

## 2018-03-31 RX ADMIN — METOPROLOL TARTRATE SCH: 50 TABLET, FILM COATED ORAL at 08:10

## 2018-03-31 RX ADMIN — SODIUM CHLORIDE SCH: 0.9 INJECTION, SOLUTION INTRAVENOUS at 09:00

## 2018-03-31 RX ADMIN — AMLODIPINE BESYLATE SCH: 2.5 TABLET ORAL at 08:10

## 2018-03-31 NOTE — PN
Date of Progress Note:  03/31/2018



Subjective:  The patient seen and examined, chart reviewed, and case discussed with RN.  The patient 
awaiting bedside scope by Dr. Barragan.  No further nose bleeding or syncopal episode.



Review of Systems:

Negative except as above.



Medications:  Reviewed.



Physical Examination:

Vital Signs:  Temperature 98.2, heart rate 82, blood pressure 100/57, respirations 18, and O2 99% on 
room air. 

General:  awake, alert, oriented x3, not in any acute distress.  Elderly female, cachectic.  BMI 19. 


CV:  S1, S2.  Peripheral pulses present bilaterally.  Regular rate and rhythm. 

Respiratory:  Moving air well bilaterally.  No wheezing. 

Abdomen:  Soft, nontender, nondistended.  Positive bowel sounds. 

Extremities:  No clubbing, cyanosis, or edema. 

Skin:  Multiple ecchymoses. 

Neurologic:  Nonfocal. 

HEENT:  No swollen turbinates.  Some dried blood in the nares.  No active signs of bleeding.  Orophar
ynx is clear.



Laboratory Data:  Sodium 139, potassium 4.2, chloride 110, CO2 19, BUN 33, creatinine 1.11, glucose 9
8, and calcium 9.2.  WBC 8.6, H and H 9.8, 30.2, and platelets 273.  Urine culture shows 4+ gram-nega
tive rods.  Carotid artery ultrasound shows mild mixed plaquing present in both proximal ICAs.  No ev
idence of hemodynamically significant stenosis.



Assessment And Plan:  An 86-year-old female with;

1.Syncopal episode, likely vasovagal.  We will check orthostatic vital signs.  Carotid ultrasound ne
gative.  Continue fall precautions and initiate physical therapy.

2.Epistaxis, unclear etiology.  Appreciate Dr. Barragan's input.  Plan is for a tentative scope this 
morning.

3.Essential hypertension.  Blood pressure improved, however, again low this morning.  We will hold b
lood pressure medications for now and we will adjust home meds prior to discharge.

4.Generalized anxiety disorder.

5.Gastroesophageal reflux disease.  Continue PPI.

6.Hypothyroidism.  Continue thyroid replacement.

7.Malnutrition and failure to thrive.  The patient has recent weight loss and body mass index is 19.
  Dietary consult, protein supplements.

8.History of breast cancer.

9.Gastrointestinal and deep venous thrombosis prophylaxis, PPI and SCDs.  No chemical anticoagulatio
n due to bleed.





SA/MODL

DD:  03/31/2018 09:25:14Voice ID:  [id number]

DT:  03/31/2018 11:27:59Report ID:  [id number]

## 2018-03-31 NOTE — RAD REPORT
EXAM DESCRIPTION:  CT - Chest Abdomen Pelvis W Cont - 3/31/2018 3:37 pm

 

CLINICAL HISTORY:  Chest and abdomen pain.

Hemoptysis. GI bleeding.

 

COMPARISON:  None.

 

TECHNIQUE  All CT scans are performed using dose optimization technique as appropriate and may includ
e automated exposure control or mA/KV adjustment according to patient size.

 

FINDINGS:  There are linear areas of subsegmental atelectasis in both lung bases. No focal infiltrate
 worrisome for pneumonia is seen.No pleural or pericardial effusion.No intrathoracic adenopathy.

 

The liver contains multiple rounded low-density lesions most likely representing benign cysts. The ga
llbladder appears surgically absent. The spleen, adrenal glands and kidneys are within normal limits.
 Mild pancreatic atrophy seen.

 

No bowel obstruction, free air, free fluid or abscess. Mild thickening of the rectosigmoid mucosa is 
present. No pneumatosis. The appendix is not identified as a discrete structure, however, no secondar
y findings of appendicitis are identified.

 

Chronic bilateral spondylolysis is present at the lumbosacral junction. No aggressive bone lesion.

 

Urinary bladder demonstrates mild wall thickening in a tiny air bubble, suggesting cystitis.

 

IMPRESSION:  Mild thickening of the rectosigmoid mucosa could indicate colitis. No pneumatosis is see
n.

 

Urinary bladder wall thickening is seen with a tiny air bubble present, suggesting cystitis.

## 2018-04-01 LAB
ALBUMIN SERPL BCP-MCNC: 3.3 G/DL (ref 3.2–5.5)
ALP SERPL-CCNC: 84 IU/L (ref 42–121)
ALT SERPL W P-5'-P-CCNC: 12 IU/L (ref 10–60)
AST SERPL W P-5'-P-CCNC: 23 IU/L (ref 10–42)
BUN BLD-MCNC: 22 MG/DL (ref 6–20)
GLUCOSE SERPLBLD-MCNC: 94 MG/DL (ref 65–120)
HCT VFR BLD CALC: 25.4 % (ref 36–45)
LYMPHOCYTES # SPEC AUTO: 1.7 K/UL (ref 0.7–4.9)
MCH RBC QN AUTO: 27.2 PG (ref 27–35)
MCV RBC: 82 FL (ref 80–100)
PMV BLD: 7.8 FL (ref 7.6–11.3)
POTASSIUM SERPL-SCNC: 4 MEQ/L (ref 3.6–5)
RBC # BLD: 3.09 M/UL (ref 3.86–4.86)

## 2018-04-01 RX ADMIN — METRONIDAZOLE SCH MLS: 500 INJECTION, SOLUTION INTRAVENOUS at 10:04

## 2018-04-01 RX ADMIN — PHYTONADIONE SCH: 10 INJECTION, EMULSION INTRAMUSCULAR; INTRAVENOUS; SUBCUTANEOUS at 23:00

## 2018-04-01 RX ADMIN — METRONIDAZOLE SCH MLS: 500 INJECTION, SOLUTION INTRAVENOUS at 01:11

## 2018-04-01 RX ADMIN — SODIUM CHLORIDE SCH MLS: 0.9 INJECTION, SOLUTION INTRAVENOUS at 04:03

## 2018-04-01 RX ADMIN — METRONIDAZOLE SCH MLS: 500 INJECTION, SOLUTION INTRAVENOUS at 16:29

## 2018-04-01 RX ADMIN — LEVOTHYROXINE SODIUM SCH MG: 0.1 TABLET ORAL at 05:02

## 2018-04-01 RX ADMIN — AMLODIPINE BESYLATE SCH MG: 2.5 TABLET ORAL at 15:44

## 2018-04-01 RX ADMIN — Medication SCH: at 20:41

## 2018-04-01 RX ADMIN — METOPROLOL TARTRATE SCH MG: 50 TABLET, FILM COATED ORAL at 15:44

## 2018-04-01 RX ADMIN — Medication SCH ML: at 20:39

## 2018-04-01 RX ADMIN — SODIUM CHLORIDE SCH MLS: 0.9 INJECTION, SOLUTION INTRAVENOUS at 22:41

## 2018-04-01 RX ADMIN — AMLODIPINE BESYLATE SCH: 2.5 TABLET ORAL at 09:00

## 2018-04-01 RX ADMIN — SODIUM CHLORIDE SCH: 0.9 INJECTION, SOLUTION INTRAVENOUS at 16:00

## 2018-04-01 RX ADMIN — TEMAZEPAM PRN MG: 15 CAPSULE ORAL at 20:40

## 2018-04-01 RX ADMIN — Medication SCH: at 09:00

## 2018-04-01 RX ADMIN — SODIUM CHLORIDE SCH MLS: 0.9 INJECTION, SOLUTION INTRAVENOUS at 05:02

## 2018-04-01 RX ADMIN — SODIUM CHLORIDE SCH: 0.9 INJECTION, SOLUTION INTRAVENOUS at 14:51

## 2018-04-01 RX ADMIN — LACTOBACILLUS TAB SCH TAB: TAB at 10:05

## 2018-04-01 RX ADMIN — METOPROLOL TARTRATE SCH: 50 TABLET, FILM COATED ORAL at 09:00

## 2018-04-01 RX ADMIN — Medication SCH ML: at 13:31

## 2018-04-01 RX ADMIN — METOPROLOL TARTRATE SCH MG: 50 TABLET, FILM COATED ORAL at 20:40

## 2018-04-01 NOTE — PN
Date of Progress Note:  04/01/2018



Subjective:  The patient seen and examined.  Chart reviewed and case discussed 
with RN and Dr. Barragan as well as Dr. Rucker.  The patient continues to have 
some black tarry stool.  States that she is not feeling too well.



Review of Systems:

Negative except as above.



Medications:  Reviewed.



Physical Examination:

Vital Signs:  Temperature 97.5, heart rate 81, blood pressure 110/69, 
respirations 32, O2 94% on room air. 

General:  Awake, alert, oriented x3, in some mild distress.  Elderly female, 
cachectic. 

CV:  S1, S2.  No murmurs.  Peripheral pulses present bilaterally. 

Respiratory:  Clear to auscultation bilaterally.  No wheezing.  No stridor. 

Gastrointestinal:  Abdomen is soft, nontender, nondistended.  Positive bowel 
sounds. 

Extremities:  No clubbing, cyanosis, or edema. 

Neurologic:  Nonfocal.



Laboratory Data:  Sodium 137, potassium 4, chloride 114, CO2 16, BUN 22, 
creatinine 0.99, glucose 94, calcium 8.8.  WBC 9.1, H and H 8.4, 25.4, 
platelets 239, neutrophils 68.8%.  Urine culture shows 4+ gram-negative rods.  
ID and sensitivity pending.

CT scan abdomen and pelvis shows mild thickening of rectosigmoid mucosa, could 
indicate colitis.  No pneumatosis seen.Urinary bladder wall thickening seen 
with tiny air bubble present suggesting cystitis



Assessment And Plan:  An 86-year-old female with.

1.   Syncopal episode, likely vasovagal.  Workup negative.  Continue physical 
therapy and fall precautions.  Carotid ultrasound negative.

2.   Epistaxis, unclear etiology.  The patient was seen by Dr. Barragan with ENT 
scope to be as an outpatient if bleeding recurs.  Continue nasal spray.

3.   Essential hypertension.  Blood pressure stable.

4.   Metabolic acidosis. Bicarb

5.   Generalized anxiety disorder.

6.   Gastroesophageal reflux disease, PPI.

7.   Hypothyroidism.  Continue Synthroid.

8.   Malnutrition, failure to thrive, BMI 19.  We will start on some protein 
supplementation.

9.   History of breast cancer.

10.   GI bleed with melenic stools, unclear etiology may be related to GI bleed 
versus blood that was swallowed from epistaxis and that has been hemolyzed.  GI 
is unavailable.  We will consult Dr. Rucker.  The tentative plan is to monitor 
H and H.  May need a scope in a.m. if continues to drop, transfuse as needed.

11.   Acute kidney injury, resolved.  Continue IV fluids.

12.   Acute colitis, rectosigmoid.  We will adjust IV antibiotics.  Continue IV 
PPI.



/VALENTINA

DD:  04/01/2018 15:14:15   Voice ID:  [id number]

DT:  04/01/2018 19:18:53   Report ID:  [id number]

MTDD

## 2018-04-01 NOTE — P.CNS
Date of Consult: 04/01/18


CC: epistaxis





HPI:  I spoke with the son on Friday regarding history since patient's history 

is somewhat confused.  She woke up early in the morning with a moderate to 

large amount of blood on her bed, clothes and face with history of severe 

nosebleed about 1 year ago.  No ASA or blood thinners.  





PMH/PSH/Med/All/SH/ROS reviewed from chart.





PE: NAD.  Alert when roused from sleeping.  Face AT, NC.  EAC occluded with 

cerumen.  TM not visualized.  Nares patent with mildly dry mucosa and light 

crusting in R.  OC with multiple caries, crusting on lips, large patalal torus.

  OP with no blood or clot, normal size tonsils for age (very small).





A: Epistaxis, possible other source such as UGI bleeding. No active bleeding 

has been noted during hospitalization





P:  Nasal saline spray.  Return to Dr. Barragan's office if additional nose 

bleeding occurs.

## 2018-04-01 NOTE — CON
Date of Consultation:  04/01/2018



Brief History Of Present Illness:  The patient is an 86-year-old female who presents to the hospital 
with past medical history of GERD, hypothyroidism, breast cancer, anxiety, hypertension, who was in u
sual state of health until 2 days ago when she noted significant epistaxis after her home health nurs
e left for the day.  She denied any dizziness or lightheadedness, but she was found the next morning 
on 03/30 by her home health aide to have a significant nosebleed with bright red blood on the sheets.
  She contacted 911, was found to have a syncopal episode while using the restroom.  She was in the p
resence of her caregiver during that time and did not have any fall or hit her head.  It only lasts f
or a few seconds and there was no seizure type activity.  The patient has not had a recurrent noseble
ed since the prior day of admission.  She states to me that she had noted a large amount of blood mor
e than she has ever seen a bright red blood coming out of her nose and her mouth.  She states that sh
e did not have any nausea, vomiting, or hematemesis, but noted a continuous drip-drip-drip-type blood
 from her nose predominantly.  She did not notice any bright red blood or melenic stool up until this
 morning.  This morning was the first episode of melenic stool; however, she states that she did swal
low some of the blood during her episode of nose bleeding on the prior days.  Her hemoglobin was 10.6
 on admission, which was near her baseline by report.



Past Medical History:  Significant for hypertension, anxiety disorder, history of breast cancer, GERD
, and hypothyroidism.



Past Surgical History:  She has had an appendectomy and a partial gastrectomy for ulcers many, many y
ears ago.  She cannot recall the exact date.  She has never had an upper or lower endoscopy by her re
port as well.



Allergies:  NEFAZODONE, MORPHINE, OPIOIDS, MEPERIDINE, METHADONE, PROPOXYPHENE, FERROUS SULFATE.



Home Medications:  Include Norvasc, Sinequan, hydrocodone, Synthroid, Ativan, Cozaar, Lopressor, Prot
jack, and Paxil.



Social History:  She lives alone.  Does have a home health agent.  She is .  She has a son who
 helps and visits often.  She denies any smoking or alcohol or recreational drug use.



Family History:  Noncontributory.



Review of Systems:

A 10-point review of systems other than HPI, denies.



Physical Examination:

General:  At the time of my examination.  She is awake, alert, and oriented. 

Psychiatric:  She is appropriate and conversive.  She is in no distress. 

HEENT:  She is normocephalic.  Her sclerae are anicteric.  Her oropharynx is clear.  There is no bloo
d evident in her mouth or obvious external nares. 

Neck:  Supple.  No JVD. 

Cardiovascular:  Regular rate and rhythm. 

Pulmonary:  Clear to auscultation bilaterally. 

Abdomen:  Soft, nontender, and nondistended. 

Skin:  No rashes.



Laboratory Data:  She had a laboratory exam, which revealed a white blood cell count of 9.1, hemoglob
in is 8.4 from 9.8 the previous day, her hematocrit is 25.4, her platelet count is 239.  Her PT was 1
2.2, INR 1.03, PTT 27.5.  Her sodium 137, potassium 4.0, chloride 114, carbon dioxide 16, BUN 22, cre
atinine 0.99, glucose is 94.  The remainder of her laboratory exam is essentially normal.  Her urinal
ysis showed greater than 50 red blood cells and greater than 50 bacteria and 3+ blood, 1+ leukocyte e
sterase.  She had a chest x-ray performed which is officially read as no acute cardiopulmonary proces
ses.  She had a CT scan of the abdomen, chest, and pelvis, which was read as mild thickening of the r
ectosigmoid mucosa could indicate colitis.  No pneumatosis seen.  Urinary bladder wall is thickened w
ith tiny air bubble present suggesting cystitis.  She had a carotid artery ultrasound, which showed m
ixed plaques presenting in both ICAs.  No evidence of hemodynamically significant stenosis.  She had 
a head CT, cervical spine CT performed, which showed advanced atrophy and chronic ischemic changes si
milar to prior study.  No acute intracranial finding, mild for age.  Cervical spine degenerative barrow
ges.  No acute findings.  Central canal details inherently limited.  Chronic sphenoid sinusitis.



Assessment And Plan:  This is an 86-year-old female who comes in with evidence of epistaxis and decre
ase in her hemoglobin due to acute blood loss.  

1.IV fluid hydration.

2.Blood and product resuscitation as needed.

3.I agree with Dr. Barragan consultation.

4.The patient will likely require upper esophagogastroduodenoscopy and colonoscopy possibly during t
his admission depending on her hemodynamic status as well as her hemoglobin check.

5.The patient will definitely require esophagogastroduodenoscopy/colonoscopy either as an inpatient 
or an outpatient in the very near future.  I have explained the risks, benefits, alternatives of the 
above stated plan.  The patient agrees to proceed as indicated. 

Thank you for this interesting consult.





JEAN MARIE

DD:  04/01/2018 10:51:19Voice ID:  [id number]

DT:  04/01/2018 15:26:46Report ID:  [id number]

## 2018-04-01 NOTE — EKG
Test Date:    2018-03-30               Test Time:    09:40:19

Technician:   VINCENT                                     

                                                     

MEASUREMENT RESULTS:                                       

Intervals:                                           

Rate:         79                                     

SC:           128                                    

QRSD:         78                                     

QT:           396                                    

QTc:          454                                    

Axis:                                                

P:                                                   

SC:           128                                    

QRS:          -3                                     

T:            78                                     

                                                     

INTERPRETIVE STATEMENTS:                                       

                                                     

Normal sinus rhythm

Left ventricular hypertrophy with repolarization abnormality

Abnormal ECG

Compared to ECG 09/07/2017 06:47:40

No significant changes



Electronically Signed On 04-01-18 12:54:51 CDT by Rodriguez Fonseca

## 2018-04-02 LAB
ALBUMIN SERPL BCP-MCNC: 3.3 G/DL (ref 3.2–5.5)
ALP SERPL-CCNC: 80 IU/L (ref 42–121)
ALT SERPL W P-5'-P-CCNC: 12 IU/L (ref 10–60)
AST SERPL W P-5'-P-CCNC: 24 IU/L (ref 10–42)
BLD SMEAR INTERP: (no result)
BUN BLD-MCNC: 15 MG/DL (ref 6–20)
GLUCOSE SERPLBLD-MCNC: 131 MG/DL (ref 65–120)
HCT VFR BLD CALC: 24.4 % (ref 36–45)
LYMPHOCYTES # SPEC AUTO: 1.2 K/UL (ref 0.7–4.9)
MCH RBC QN AUTO: 26.9 PG (ref 27–35)
MCV RBC: 82.3 FL (ref 80–100)
MORPHOLOGY BLD-IMP: (no result)
OVALOCYTES BLD QL SMEAR: (no result)
PMV BLD: 8 FL (ref 7.6–11.3)
POTASSIUM SERPL-SCNC: 3.2 MEQ/L (ref 3.6–5)
RBC # BLD: 2.96 M/UL (ref 3.86–4.86)

## 2018-04-02 RX ADMIN — PHYTONADIONE SCH: 10 INJECTION, EMULSION INTRAMUSCULAR; INTRAVENOUS; SUBCUTANEOUS at 22:10

## 2018-04-02 RX ADMIN — SALINE NASAL SPRAY SCH SPRAYS: 1.5 SOLUTION NASAL at 21:05

## 2018-04-02 RX ADMIN — Medication SCH: at 21:00

## 2018-04-02 RX ADMIN — METOPROLOL TARTRATE SCH MG: 50 TABLET, FILM COATED ORAL at 21:05

## 2018-04-02 RX ADMIN — SODIUM CHLORIDE SCH MLS: 0.45 INJECTION, SOLUTION INTRAVENOUS at 15:51

## 2018-04-02 RX ADMIN — SODIUM CHLORIDE SCH MLS: 0.9 INJECTION, SOLUTION INTRAVENOUS at 17:55

## 2018-04-02 RX ADMIN — LACTOBACILLUS TAB SCH TAB: TAB at 08:45

## 2018-04-02 RX ADMIN — METOPROLOL TARTRATE SCH MG: 50 TABLET, FILM COATED ORAL at 08:45

## 2018-04-02 RX ADMIN — SALINE NASAL SPRAY SCH SPRAYS: 1.5 SOLUTION NASAL at 17:55

## 2018-04-02 RX ADMIN — Medication SCH ML: at 13:30

## 2018-04-02 RX ADMIN — SODIUM CHLORIDE SCH MLS: 0.9 INJECTION, SOLUTION INTRAVENOUS at 01:36

## 2018-04-02 RX ADMIN — ONDANSETRON PRN MG: 2 INJECTION INTRAMUSCULAR; INTRAVENOUS at 06:05

## 2018-04-02 RX ADMIN — Medication SCH: at 08:46

## 2018-04-02 RX ADMIN — TAZOBACTAM SODIUM AND PIPERACILLIN SODIUM SCH MLS: 250; 2 INJECTION, SOLUTION INTRAVENOUS at 17:54

## 2018-04-02 RX ADMIN — METRONIDAZOLE SCH MLS: 500 INJECTION, SOLUTION INTRAVENOUS at 08:45

## 2018-04-02 RX ADMIN — METRONIDAZOLE SCH MLS: 500 INJECTION, SOLUTION INTRAVENOUS at 01:36

## 2018-04-02 RX ADMIN — Medication SCH ML: at 21:00

## 2018-04-02 RX ADMIN — SODIUM CHLORIDE SCH MLS: 0.9 INJECTION, SOLUTION INTRAVENOUS at 08:44

## 2018-04-02 RX ADMIN — SODIUM CHLORIDE SCH: 0.9 INJECTION, SOLUTION INTRAVENOUS at 08:49

## 2018-04-02 RX ADMIN — Medication SCH: at 08:20

## 2018-04-02 RX ADMIN — SODIUM CHLORIDE SCH MLS: 0.9 INJECTION, SOLUTION INTRAVENOUS at 13:29

## 2018-04-02 RX ADMIN — AMLODIPINE BESYLATE SCH MG: 2.5 TABLET ORAL at 08:46

## 2018-04-02 RX ADMIN — TEMAZEPAM PRN MG: 15 CAPSULE ORAL at 21:05

## 2018-04-02 RX ADMIN — SALINE NASAL SPRAY SCH SPRAYS: 1.5 SOLUTION NASAL at 13:30

## 2018-04-02 RX ADMIN — LEVOTHYROXINE SODIUM SCH: 0.1 TABLET ORAL at 05:57

## 2018-04-02 RX ADMIN — SALINE NASAL SPRAY SCH SPRAYS: 1.5 SOLUTION NASAL at 10:30

## 2018-04-02 NOTE — PN
Date of Progress Note:  04/02/2018



Subjective:  The patient is seen and examined, chart reviewed, and case discussed with RN and Dr. Aylin parsons.  The patient is going for EGD in a.m.  The patient states that, she still has a decreased appet
ite.  No further nausea, vomiting.  No bleeding.



Review of Systems:

Negative except as above.



Medications:  Reviewed.



Physical Examination:

Vital Signs:  Temperature 98.6, heart rate 91, blood pressure 127/68, respirations 18, and O2 91% on 
room air. 

General:  awake, alert, oriented x3.  Some mild distress.  Elderly female, ill-appearing, cachectic. 
 BMI 19. 

CV:  S1, S2.  No murmurs.  Regular rate and rhythm.  Peripheral pulses present. 

Respiratory:  clear to auscultation bilaterally.  No wheezing. 

Abdomen:  Soft, nontender, nondistended.  Positive bowel sounds. 

Extremities:  No clubbing, cyanosis, or edema. 

Neurologic:  Nonfocal.



Laboratory Data:  Sodium 137, potassium 3.2, chloride 111, CO2 17, BUN 15, creatinine 1.02, glucose 1
31, and calcium 8.7.  WBC 13.4, H and H 8/24.4, and platelets 264.  Urine culture shows Klebsiella pn
eumoniae and Escherichia coli, both sensitive to Rocephin.



Assessment:  An 86-year-old female with;

1.Syncopal episode, likely vasovagal.  Continue PT, fall precautions.  Workup negative.

2.Epistaxis, unclear etiology.  Continue nasal spray.  No further nosebleeds.  Appreciate Dr. Jaxson arredondo's input.

3.Essential hypertension, stable.

4.Metabolic acidosis.  Repeat bicarb dose today.

5.Generalized anxiety disorder.

6.Gastroesophageal reflux disease, PPI.

7.Hypothyroidism, Synthroid.

8.Malnutrition, failure to thrive.  Body mass index 19.  Continue protein supplementation.  The yue
ent continues to have decreased appetite.

9.Gastrointestinal bleed with melanotic stools.  May be related to upper gastrointestinal source rdoolfo
lyubov swallowed hemolyzed blood from epistaxis.  EGD scheduled for tomorrow by Dr. Rucker.  Monitor H 
and H.

10.History of breast cancer.

11.Acute kidney injury.  Creatinine stable.  Continue IV fluids.

12.Anemia, acute blood loss anemia, dropped almost 3 g.  We will transfuse if close to 7.  Plan, EGD
 in a.m.

13.Acute colitis, rectosigmoid.  IV antibiotics adjusted.





/VALENTINA

DD:  04/02/2018 13:12:50Voice ID:  [id number]

DT:  04/02/2018 14:27:06Report ID:  [id number]

## 2018-04-02 NOTE — P.PN
Subjective


Date of Service: 04/02/18


Subjective: No new changes (Patient has no complaints)





Physical Examination





- Vital Signs


Temperature: 98.6 F


Blood Pressure: 127/68


Pulse: 91


Respirations: 18


Pulse Ox (%): 90





- Physical Exam


General: Alert, In no apparent distress, Cooperative


Gastrointestinal: Soft and benign, Non-distended, No tenderness, No masses, No 

rebound, No guarding





- Studies


Microbiology Data (last 24 hrs): 








03/30/18 19:30   Clean Catch Urine   [place] Count - Final


                            >100,000 CFU/ML.


03/30/18 19:30   Clean Catch Urine    - Final


                            Klebsiella Pneumoniae


                            Escherichia Coli








Assessment And Plan





- Current Problems (Diagnosis)


(1) Acute blood loss anemia


Current Visit: Yes   Status: Acute   


Plan: 








Patient Hgb continues to drift down


- EGD in AM

## 2018-04-03 LAB
ALBUMIN SERPL BCP-MCNC: 3.5 G/DL (ref 3.2–5.5)
ALP SERPL-CCNC: 78 IU/L (ref 42–121)
ALT SERPL W P-5'-P-CCNC: 10 IU/L (ref 10–60)
AST SERPL W P-5'-P-CCNC: 23 IU/L (ref 10–42)
BUN BLD-MCNC: 12 MG/DL (ref 6–20)
GLUCOSE SERPLBLD-MCNC: 125 MG/DL (ref 65–120)
HCT VFR BLD CALC: 28.4 % (ref 36–45)
LYMPHOCYTES # SPEC AUTO: 1.2 K/UL (ref 0.7–4.9)
MAGNESIUM SERPL-MCNC: 1.5 MG/DL (ref 1.8–2.5)
MCH RBC QN AUTO: 27.8 PG (ref 27–35)
MCV RBC: 82 FL (ref 80–100)
PMV BLD: 7.7 FL (ref 7.6–11.3)
POTASSIUM SERPL-SCNC: 2.6 MEQ/L (ref 3.6–5)
RBC # BLD: 3.47 M/UL (ref 3.86–4.86)

## 2018-04-03 RX ADMIN — TAZOBACTAM SODIUM AND PIPERACILLIN SODIUM SCH MLS: 250; 2 INJECTION, SOLUTION INTRAVENOUS at 19:25

## 2018-04-03 RX ADMIN — LACTOBACILLUS TAB SCH: TAB at 07:43

## 2018-04-03 RX ADMIN — SODIUM CHLORIDE SCH: 0.45 INJECTION, SOLUTION INTRAVENOUS at 04:40

## 2018-04-03 RX ADMIN — TAZOBACTAM SODIUM AND PIPERACILLIN SODIUM SCH MLS: 250; 2 INJECTION, SOLUTION INTRAVENOUS at 12:49

## 2018-04-03 RX ADMIN — Medication SCH: at 07:43

## 2018-04-03 RX ADMIN — HYDRALAZINE HYDROCHLORIDE PRN MG: 20 INJECTION INTRAMUSCULAR; INTRAVENOUS at 10:24

## 2018-04-03 RX ADMIN — HYDRALAZINE HYDROCHLORIDE PRN MG: 20 INJECTION INTRAMUSCULAR; INTRAVENOUS at 23:39

## 2018-04-03 RX ADMIN — Medication SCH: at 20:30

## 2018-04-03 RX ADMIN — SALINE NASAL SPRAY SCH SPRAYS: 1.5 SOLUTION NASAL at 12:51

## 2018-04-03 RX ADMIN — LACTOBACILLUS TAB SCH TAB: TAB at 10:15

## 2018-04-03 RX ADMIN — SODIUM CHLORIDE SCH MLS: 0.9 INJECTION, SOLUTION INTRAVENOUS at 05:04

## 2018-04-03 RX ADMIN — Medication SCH ML: at 20:30

## 2018-04-03 RX ADMIN — TEMAZEPAM PRN MG: 15 CAPSULE ORAL at 20:29

## 2018-04-03 RX ADMIN — METOPROLOL TARTRATE SCH MG: 50 TABLET, FILM COATED ORAL at 05:04

## 2018-04-03 RX ADMIN — DEXTROSE AND SODIUM CHLORIDE SCH MLS: 5; .45 INJECTION, SOLUTION INTRAVENOUS at 12:50

## 2018-04-03 RX ADMIN — LEVOTHYROXINE SODIUM SCH MG: 0.1 TABLET ORAL at 05:04

## 2018-04-03 RX ADMIN — AMLODIPINE BESYLATE SCH MG: 2.5 TABLET ORAL at 08:15

## 2018-04-03 RX ADMIN — TAZOBACTAM SODIUM AND PIPERACILLIN SODIUM SCH MLS: 250; 2 INJECTION, SOLUTION INTRAVENOUS at 00:17

## 2018-04-03 RX ADMIN — SALINE NASAL SPRAY SCH SPRAYS: 1.5 SOLUTION NASAL at 10:16

## 2018-04-03 RX ADMIN — PHYTONADIONE SCH: 10 INJECTION, EMULSION INTRAMUSCULAR; INTRAVENOUS; SUBCUTANEOUS at 20:30

## 2018-04-03 RX ADMIN — SODIUM CHLORIDE SCH MLS: 0.9 INJECTION, SOLUTION INTRAVENOUS at 14:38

## 2018-04-03 RX ADMIN — METOPROLOL TARTRATE SCH MG: 50 TABLET, FILM COATED ORAL at 20:29

## 2018-04-03 RX ADMIN — SALINE NASAL SPRAY SCH SPRAYS: 1.5 SOLUTION NASAL at 20:30

## 2018-04-03 RX ADMIN — SALINE NASAL SPRAY SCH SPRAYS: 1.5 SOLUTION NASAL at 16:46

## 2018-04-03 RX ADMIN — Medication SCH ML: at 12:51

## 2018-04-03 NOTE — PN
Date of Progress Note:  04/03/2018



Subjective:  The patient seen and examined.  Chart reviewed and case discussed with RN and Dr. Luzmaria elkins.  The patient essentially unable to go for her scope this morning due to electrolyte abnormalities.
  The patient states that she does not have much of an appetite.  Did have some diarrhea yesterday.



Review of Systems:

Negative except as above.



Medications:  Reviewed.



Objective:  Vital signs:  Temperature 97.6, heart rate 67, blood pressure 180/90, respirations 16, O2
 91% on room air. 

General:  Awake, alert,and oriented x3, in no distress.  Elderly female, cachectic.  BMI 19. 

CV:  S1 and S2.  No murmurs.  Peripheral pulses present. 

Respiratory:  Moving air well bilaterally.  No wheezing. 

Abdomen:  Soft, nontender, nondistended.  Positive bowel sounds. 

Extremities:  No clubbing, cyanosis, or edema. 

Neuro:  Nonfocal.



Laboratory Data:  Sodium 137, potassium 2.6, chloride 103, CO2 of 24, BUN 12, creatinine 0.9, glucose
 125, calcium 8.7, magnesium 1.5, total bilirubin 1.8.  WBC 10.5, H and H 9.6 and 28.4, platelets 229
.  Urine culture shows Klebsiella pneumoniae and Escherichia coli.



Assessment And Plan:  An 86-year-old female with;

1.Syncopal episode, likely vasovagal.  To continue physical therapy.

2.Epistaxis, resolved.  Continue nasal spray.

3.Essential hypertension, stable.

4.Metabolic acidosis, improved.  We will adjust IV fluid.

5.Generalized anxiety disorder.

6.Gastroesophageal reflux disease.  PPI.

7.Hypothyroidism, Synthroid.

8.Malnutrition, failure to thrive.  BMI 19.  We will continue with protein supplementation.  The pat
ient will need EGD.

9.Gastrointestinal bleed with melenic stools, unclear source, may be a GI versus hemolyzed blood fro
m epistaxis, likely upper source, EGD in a.m.

10.Hypokalemia.  We will replace and monitor.

11.Hypomagnesemia, replace and monitor.

12.Diarrhea, resolving.

13.History of breast cancer.

14.Acute kidney injury, creatinine normalized.

15.Anemia, acute blood loss anemia.  Hemoglobin and hematocrit are stable.  We will transfuse as nee
ded.

16.Acute colitis, rectosigmoid.  Continue IV antibiotics.

17.Gastrointestinal and deep venous thrombosis prophylaxis with PPI and SCDs.  No chemical anticoagu
lation due to bleed.  Plan anticipated EGD in meme [facility]

DD:  04/03/2018 11:22:33Voice ID:  [id number]

DT:  04/03/2018 15:34:06Report ID:  [id number]

## 2018-04-04 VITALS — SYSTOLIC BLOOD PRESSURE: 180 MMHG | TEMPERATURE: 99.1 F | DIASTOLIC BLOOD PRESSURE: 77 MMHG

## 2018-04-04 VITALS — OXYGEN SATURATION: 98 %

## 2018-04-04 LAB
ALBUMIN SERPL BCP-MCNC: 3.4 G/DL (ref 3.2–5.5)
ALP SERPL-CCNC: 71 IU/L (ref 42–121)
ALT SERPL W P-5'-P-CCNC: 11 IU/L (ref 10–60)
AST SERPL W P-5'-P-CCNC: 22 IU/L (ref 10–42)
BUN BLD-MCNC: 8 MG/DL (ref 6–20)
GLUCOSE SERPLBLD-MCNC: 150 MG/DL (ref 65–120)
HCT VFR BLD CALC: 29.2 % (ref 36–45)
HCT VFR BLD CALC: 32.4 % (ref 36–45)
LYMPHOCYTES # SPEC AUTO: 1 K/UL (ref 0.7–4.9)
MCH RBC QN AUTO: 28.3 PG (ref 27–35)
MCV RBC: 81.2 FL (ref 80–100)
PMV BLD: 7.8 FL (ref 7.6–11.3)
POTASSIUM SERPL-SCNC: 2.8 MEQ/L (ref 3.6–5)
RBC # BLD: 3.6 M/UL (ref 3.86–4.86)

## 2018-04-04 PROCEDURE — 0DB98ZX EXCISION OF DUODENUM, VIA NATURAL OR ARTIFICIAL OPENING ENDOSCOPIC, DIAGNOSTIC: ICD-10-PCS

## 2018-04-04 PROCEDURE — 0W3P8ZZ CONTROL BLEEDING IN GASTROINTESTINAL TRACT, VIA NATURAL OR ARTIFICIAL OPENING ENDOSCOPIC: ICD-10-PCS

## 2018-04-04 PROCEDURE — 0DB68ZX EXCISION OF STOMACH, VIA NATURAL OR ARTIFICIAL OPENING ENDOSCOPIC, DIAGNOSTIC: ICD-10-PCS

## 2018-04-04 RX ADMIN — DEXTROSE AND SODIUM CHLORIDE SCH: 5; .45 INJECTION, SOLUTION INTRAVENOUS at 01:20

## 2018-04-04 RX ADMIN — SALINE NASAL SPRAY SCH: 1.5 SOLUTION NASAL at 13:00

## 2018-04-04 RX ADMIN — HYDRALAZINE HYDROCHLORIDE PRN MG: 20 INJECTION INTRAMUSCULAR; INTRAVENOUS at 06:03

## 2018-04-04 RX ADMIN — Medication SCH ML: at 10:33

## 2018-04-04 RX ADMIN — AMLODIPINE BESYLATE SCH MG: 2.5 TABLET ORAL at 10:30

## 2018-04-04 RX ADMIN — TAZOBACTAM SODIUM AND PIPERACILLIN SODIUM SCH MLS: 250; 2 INJECTION, SOLUTION INTRAVENOUS at 10:32

## 2018-04-04 RX ADMIN — LACTOBACILLUS TAB SCH: TAB at 09:00

## 2018-04-04 RX ADMIN — METOPROLOL TARTRATE SCH: 50 TABLET, FILM COATED ORAL at 09:00

## 2018-04-04 RX ADMIN — Medication SCH: at 13:09

## 2018-04-04 RX ADMIN — TAZOBACTAM SODIUM AND PIPERACILLIN SODIUM SCH MLS: 250; 2 INJECTION, SOLUTION INTRAVENOUS at 01:02

## 2018-04-04 RX ADMIN — LEVOTHYROXINE SODIUM SCH MG: 0.1 TABLET ORAL at 06:00

## 2018-04-04 RX ADMIN — SALINE NASAL SPRAY SCH SPRAYS: 1.5 SOLUTION NASAL at 10:29

## 2018-04-04 RX ADMIN — SODIUM CHLORIDE SCH MLS: 0.9 INJECTION, SOLUTION INTRAVENOUS at 10:32

## 2018-04-04 RX ADMIN — SODIUM CHLORIDE SCH MLS: 0.9 INJECTION, SOLUTION INTRAVENOUS at 01:02

## 2018-04-04 RX ADMIN — LACTOBACILLUS TAB SCH TAB: TAB at 10:32

## 2018-04-04 RX ADMIN — METOPROLOL TARTRATE SCH MG: 50 TABLET, FILM COATED ORAL at 10:31

## 2018-04-04 RX ADMIN — Medication SCH: at 09:00

## 2018-04-04 NOTE — ENDO RPT
[address], [address]





EGD PROCEDURE REPORT     EXAM DATE: 04/04/2018



PATIENT NAME:          Delia Stewart          MR#:       [id number]

YOB: 1931     VISIT #:     [id number]

ATTENDING:     Andres Rucker DR     STATUS:     inpatient - [facility]

ASSISTANT:      Alexandria Teixeira RN and Mary Natarajan



INDICATIONS:  The patient is a 86 yr old Female here for an EGD due to anemia

and Blood Loss Anemia

PROCEDURE PERFORMED:     EGD with biopsy for H.  pylori and EGD for control of

bleeding

MEDICATIONS:     Per Anesthesia.

TOPICAL ANESTHETIC:     none



CONSENT:  The patient understands the risks and benefits of the procedure and

understands that these risks include, but are not limited to: sedation,

allergic reaction, infection, perforation and/or bleeding. Alternative means of

evaluation and treatment include, among others: physical exam, x-rays, and/or

surgical intervention. The patient elects to proceed with this endoscopic

procedure.



DESCRIPTION OF PROCEDURE:  During intra-op preparation period all mechanical 

medical equipment was checked for proper function. Hand hygiene and appropriate

measures for infection prevention was taken.  Procedure, possible

complications, and alternatives including but not limited to the possibility of

bleeding, perforation, tear, infection, sepsis, need for surgery, need for

blood transfusion, and anesthesia related complications were explained to the

patient.  After the risks, benefits and alternatives of the procedure were

thoroughly explained, Informed consent was verified, confirmed and timeout was

successfully executed by the treatment team. The patient was  placed in the

left lateral position.  The patient was anesthetized with topical anesthesia.

Through the anesthetized oropharyngeal area, the scope was passed without any

difficulty.  The [id number] ([id number]) endoscope was introduced through the mouth

and advanced to the anastamosis of the stomach and jejunum.  The patient

appears to have prior Bilroth 2 Gastrectomy and as such the scope was then

passed to the biliopancreatic limb and then jejunal limbs of the Bilroth 2

anastamosis. In the BP limb there were some diverticulum near the papilla, no

sequelae of bleeding. There was some mild duodenitis in this limb and a biopsy

was taken for path and H.Pylori.  The scope was then passed down the jejunal

limb which was found to be normal.  The scope was returned to the G-J

anastamosis and a small marginal ulcer was noted on the G-J anastamosis, there

was ad adherant clot which was irrigated and found to have some bleeding.  A

single clip was placed with good hemostatis at this point.  Biopsies were taken

from the G-J for path and H.Pylori.   Retroflexed views revealed a mild

gastritis and a small hiatal hernia.  The esophagus was then inspected and

found to have some mild esophagitis type changes predominantly in the distal

esophagus.  The gastroscope was then slowly withdrawn and removed.



Ulceration was found in the biliopancreatic limb of the duodenum.  A biopsy for

H.  pylori was taken.  A clip was also placed with good hemostasis.   Moderate

gastritis was found at the anastomosis.  Marginal Ulceration was noted @

Anastamosis,  A biopsy for H.  pylori was taken.   A biopsy for H.  pylori was

taken.







ADVERSE EVENTS:     There were no complications.

IMPRESSIONS:     1.  Diverticula were found in the biliopancreatic portion of

the duodenum

2.  Moderate gastritis was found at the anastomosis

3.  A marginal ulceration was found at the anastomosis

4.  Duodenitis

5.  Esophagitis

6.  Gastrojejumostomy, ulcer



RECOMMENDATIONS:     1.  anti-reflux regimen

2.  acid suppression therapy

3.  await biopsy results

4.  avoid NSAIDS

5.  follow-up: office 2 week(s)

6.  hematocrit

7.  follow-up of helicobacter pylori status, treat if indicated

REPEAT EXAM:





___________________________________

Andres Rucker DR

eSigned:  Andres Rucker DR 04/04/2018 9:39 AM





cc:



CPT CODES:

ICD9 CODES:





PATIENT NAME:  Delia Stewart

MR#: [id number]

## 2019-07-17 ENCOUNTER — HOSPITAL ENCOUNTER (EMERGENCY)
Dept: HOSPITAL 97 - ER | Age: 84
Discharge: HOME | End: 2019-07-17
Payer: COMMERCIAL

## 2019-07-17 VITALS — OXYGEN SATURATION: 97 % | TEMPERATURE: 97.8 F | SYSTOLIC BLOOD PRESSURE: 115 MMHG | DIASTOLIC BLOOD PRESSURE: 78 MMHG

## 2019-07-17 DIAGNOSIS — Z88.8: ICD-10-CM

## 2019-07-17 DIAGNOSIS — Z85.3: ICD-10-CM

## 2019-07-17 DIAGNOSIS — Z88.5: ICD-10-CM

## 2019-07-17 DIAGNOSIS — N39.0: ICD-10-CM

## 2019-07-17 DIAGNOSIS — E86.0: Primary | ICD-10-CM

## 2019-07-17 DIAGNOSIS — I10: ICD-10-CM

## 2019-07-17 LAB
ALBUMIN SERPL BCP-MCNC: 3.1 G/DL (ref 3.4–5)
ALP SERPL-CCNC: 119 U/L (ref 45–117)
ALT SERPL W P-5'-P-CCNC: 20 U/L (ref 12–78)
ANISOCYTOSIS BLD QL: (no result)
AST SERPL W P-5'-P-CCNC: 25 U/L (ref 15–37)
BLD SMEAR INTERP: (no result)
BUN BLD-MCNC: 25 MG/DL (ref 7–18)
GLUCOSE SERPLBLD-MCNC: 119 MG/DL (ref 74–106)
HCT VFR BLD CALC: 39 % (ref 36–45)
LIPASE SERPL-CCNC: 75 U/L (ref 73–393)
LYMPHOCYTES # SPEC AUTO: 1.3 K/UL (ref 0.7–4.9)
MORPHOLOGY BLD-IMP: (no result)
PMV BLD: 7.2 FL (ref 7.6–11.3)
POTASSIUM SERPL-SCNC: 3.8 MMOL/L (ref 3.5–5.1)
RBC # BLD: 4.49 M/UL (ref 3.86–4.86)
TROPONIN (EMERG DEPT USE ONLY): < 0.02 NG/ML (ref 0–0.04)
UA COMPLETE W REFLEX CULTURE PNL UR: (no result)

## 2019-07-17 PROCEDURE — 83605 ASSAY OF LACTIC ACID: CPT

## 2019-07-17 PROCEDURE — 84484 ASSAY OF TROPONIN QUANT: CPT

## 2019-07-17 PROCEDURE — 93005 ELECTROCARDIOGRAM TRACING: CPT

## 2019-07-17 PROCEDURE — 99284 EMERGENCY DEPT VISIT MOD MDM: CPT

## 2019-07-17 PROCEDURE — 85025 COMPLETE CBC W/AUTO DIFF WBC: CPT

## 2019-07-17 PROCEDURE — 36415 COLL VENOUS BLD VENIPUNCTURE: CPT

## 2019-07-17 PROCEDURE — 87086 URINE CULTURE/COLONY COUNT: CPT

## 2019-07-17 PROCEDURE — 81003 URINALYSIS AUTO W/O SCOPE: CPT

## 2019-07-17 PROCEDURE — 87088 URINE BACTERIA CULTURE: CPT

## 2019-07-17 PROCEDURE — 84145 PROCALCITONIN (PCT): CPT

## 2019-07-17 PROCEDURE — 74177 CT ABD & PELVIS W/CONTRAST: CPT

## 2019-07-17 PROCEDURE — 81015 MICROSCOPIC EXAM OF URINE: CPT

## 2019-07-17 PROCEDURE — 87040 BLOOD CULTURE FOR BACTERIA: CPT

## 2019-07-17 PROCEDURE — 80076 HEPATIC FUNCTION PANEL: CPT

## 2019-07-17 PROCEDURE — 80048 BASIC METABOLIC PNL TOTAL CA: CPT

## 2019-07-17 PROCEDURE — 83690 ASSAY OF LIPASE: CPT

## 2019-07-17 NOTE — XMS REPORT
Summary of Care: 9/10/17 - 17

 Created on:March 15, 2089



Patient:NORBERTO STEWART

Sex:Female

:1931

External Reference #:[id number]





Demographics







 Address  [address] [place] [id number]

 

 Home Phone  [phone]

 

 Email Address  NONE

 

 Preferred Language  Unknown

 

 Marital Status  Single

 

 Worship Affiliation  Yarsanism

 

 Race  White/

 

 Ethnic Group  Unknown









Author







 Organization  [facility]

 

 Address  [address] [place] [id number]-

 

 Phone  [phone]









Encounter

HQ Farrahr_bernardo(FIN) [id number] Date(s): 9/10/17 - 17

[address] Professional Services provided by        
    [facility]       [place] [address]-  
   [phone]

Discharge Disposition: Home or Self Care

Attending Physician: Henrry Rubalcava MD

Admitting Physician: Allen Herndon MD





Vital Signs







 Most recent to oldest  1  2  3



 [Reference Range]:      

 

 Height  149.86 cm    



   (9/10/17 11:05 PM)    

 

 Temperature Oral [96.4-99.1  97.9 DegF  97.7 DegF  97.7 DegF



 DegF]  (17 8:08 AM)  (17 2:55 AM)  (17 11:22 PM)

 

 Blood Pressure [/60-90  132/68 mmHg  114/67 mmHg  116/67 mmHg



 mmHg]  (17 8:08 AM)  (17 2:55 AM)  (17 11:22 PM)

 

 Respiratory Rate [14-20  18 BRMIN  18 BRMIN  20 BRMIN



 BRMIN]  (17 8:08 AM)  (17 2:55 AM)  (17 11:22 PM)

 

 Peripheral Pulse Rate [  79 bpm  86 bpm  82 bpm



 bpm]  (17 8:08 AM)  (17 2:55 AM)  (17 11:22 PM)

 

 Weight  33.182 kg  34.091 kg  



   (9/10/17 11:05 PM)  (9/10/17 2:26 PM)  

 

 Body Mass Index  14.78 m2    



   (9/10/17 11:05 PM)    







Problem List







 Condition  Effective Dates  Status  Health Status  Informant

 

 GERD (gastroesophageal reflux    Resolved    



 disease)(Confirmed)        

 

 HTN (hypertension)(Confirmed)    Resolved    

 

 Breast cancer(Confirmed)  193  Resolved    







Allergies, Adverse Reactions, Alerts







 Substance  Reaction  Severity  Status

 

 ferrous sulfate      Active

 

 meperidine      Active

 

 methadone      Active

 

 morphine      Active

 

 nefazodone      Active

 

 propoxyphene      Active

 

 traZODone      Active







Medications

acetaminophen

650 mg, 2 tab, Route: PO, Drug form: TAB, Q6H, Dosing Weight 34.091, kg, PRN 
Pain 1-3/Temp > 100.4 F, Start date: 09/10/17 18:48:00 CDT, Duration: 30 day, 
Stop date: 10/10/17 18:47:00 CDT

Notes: Do not exceed 4 gm/day.  (Same as: Tylenol)

Start Date: 9/10/17

Stop Date: 17

Status: DiscontinuedAmbien

5 mg, 1 tab, Route: PO, Drug form: TAB, ONCE, Dosing Weight 33.182, kg, PRN as 
needed for insomnia, Priority: NOW, Start date: 17 0:42:00 CDT

Notes: (Same As: Ambien)

Start Date: 17

Stop Date: 17

Status: CompletedAmbien

5 mg, 1 tab, Route: PO, Drug form: TAB, ONCE, Dosing Weight 33.182, kg, PRN as 
needed for insomnia, Start date: 17 21:37:00 CDT

Notes: (Same As: Ambien)

Start Date: 17

Stop Date: 17

Status: CompletedAmbien

5 mg, 1 tab, Route: PO, Drug form: TAB, Bedtime, Dosing Weight 33.182, kg, PRN 
Insomnia, Start date:17 20:51:00 CDT, Duration: 30 day, Stop date: 10/11/
17 20:50:00 CDT

Notes: (Same As: Ambien)

Start Date: 17

Stop Date: 17

Status: DiscontinuedamLODIPine

2.5 mg, 1 tab, Route: PO, Drug form: TAB, ONCE, Dosing Weight 34.091, kg, Start 
date: 09/10/17 15:29:00 CDT, Stop date: 09/10/17 15:29:00 CDT

Notes: (Same as: Norvasc)

Start Date: 9/10/17

Stop Date: 9/10/17

Status: CompletedamLODIPine

10 mg, 1 tab, Route: PO, Drug form: TAB, Daily, Dosing Weight 34.091, kg, Start 
date: 09/10/17 19:00:00 CDT, Stop date: 10/10/17 9:00:00 CDT

Notes: (Same as: Norvasc)

Start Date: 9/10/17

Stop Date: 17

Status: DiscontinuedamLODIPine

2.5 mg, PO, Daily, 0 Refill(s)

Start Date: 9/10/17

Stop Date: 17

Status: Discontinuedbisacodyl

10 mg, 1 supp, Route: OK, Drug form: SUPP, Daily, Dosing Weight 34.091, kg, PRN 
Constipation, Start date: 09/10/17 18:48:00 CDT, Duration: 30 day, Stop date: 10
/10/17 18:47:00 CDT

Notes: (Same As: Dulcolax, Bisco-Lax)

Start Date: 9/10/17

Stop Date: 17

Status: Discontinuedcalcium-vitamin D 500 mg-400 intl units oral tablet, 
chewable

1 tab, CHEW, BID, # 60 tab, 0 Refill(s), Pharmacy: [facility] [id number]

Start Date: 17

Stop Date: 10/14/17

Status: Orderedcalcium-vitamin D 500 mg-400 intl units oral tablet, chewable

1 tab, Route: CHEW, Drug Form: CHEWTAB, Dosing Weight 33.182, kg, BID, Start 
date: 17 9:00:00 CDT, Duration: 30 day, Stop date: 10/10/17 17:00:00 CDT

Notes: (calcium carbonate-vit D 500mg-400unit chew TAB)  Same as:  Oscal 500+D

Start Date: 17

Stop Date: 17

Status: Discontinueddocusate

100 mg, 1 cap, Route: PO, Drug form: CAP, BID, Dosing Weight 34.091, kg, Start 
date: 17 9:00:00 CDT, Duration: 30 day, Stop date: 10/10/17 17:00:00 CDT

Notes: (Same as: Colace) (Do Not Crush)

Start Date: 17

Stop Date: 17

Status: Discontinueddocusate sodium 100 mg oral capsule

100 mg=1 cap, PO, BID, # 60 cap, 0 Refill(s), Pharmacy: [facility] 
[id number]

Start Date: 17

Stop Date: 10/14/17

Status: Orderedenoxaparin

40 mg, 0.4 mL, Route: SUB-Q, Drug form: INJ, [id number], Dosing Weight 34.091, kg
, Start date: 09/10/17 19:00:00 CDT, Duration: 30 day, Stop date: 10/09/17 21:00
:00 CDT

Notes: (Same as: Lovenox)

Start Date: 9/10/17

Stop Date: 17

Status: Discontinuedenoxaparin 40 mg/0.4 mL subcutaneous solution

40 mg=0.4 mL, SUB-Q, [id number], X 21 day, # 8 mL, 0 Refill(s), Pharmacy: 
[facility] [id number]

Start Date: 17

Stop Date: 10/5/17

Status: Orderedergocalciferol 50,000 intl units oral capsule

50,000 IntlUnit, 1 cap, Route: PO, Drug form: CAP, Q7D, Dosing Weight 33.182, kg
, Start date: 17 16:00:00 CDT, Duration: 30 day, Stop date: 10/10/17 9:00:
00 CDT

Notes: (Same as: Vitamin D)  "Do Not Crush"

Start Date: 17

Stop Date: 17

Status: Discontinuedergocalciferol 50,000 intl units oral capsule

50,000 IntlUnit=1 cap, PO, Q7D, # 5 cap, 0 Refill(s), Pharmacy: [facility] [id number]

Start Date: 17

Stop Date: 10/14/17

Status: OrderedhydrALAZINE

10 mg, 0.5 mL, Route: IV, Drug form: INJ, Q6H, Dosing Weight 34.091, kg, PRN 
Hypertension, Start date: 09/10/17 18:51:00 CDT, Duration: 30 day, Stop date: 10
/10/17 18:50:00 CDT

Notes: (Same as: Apresoline)Push over 5 minutes

Start Date: 9/10/17

Stop Date: 17

Status: Discontinuedloperamide

2 mg, 10 mL, Route: PO, Drug form: LIQ, Q4H, Dosing Weight 33.182, kg, PRN as 
needed for loose stool, Start date: 17 12:00:00 CDT, Duration: 30 day, 
Stop date: 10/12/17 8:00:00 CDT

Notes: Same as Imodium

Start Date: 17

Stop Date: 17

Status: DiscontinuedMaalox Advanced Regular Strength SUSP

30 mL, Route: PO, Drug Form: SUSP, Dosing Weight 33.182, kg, QID, PRN Heartburn
, Start date: 17 19:15:00 CDT, Duration: 30 day, Stop date: 10/13/17 19:14
:00 CDT

Notes: (aluminum hydroxide-magnesium hyd-simethicone [phone]/5ml 30 ml ud 
ISABEL)

Start Date: 17

Stop Date: 17

Status: Discontinuedmelatonin

3 mg, 1 tab, Route: PO, Drug form: TAB, Bedtime, Dosing Weight 34.091, kg, PRN 
Insomnia, Start date:09/10/17 18:48:00 CDT, Duration: 30 day, Stop date: 10/10/
17 18:47:00 CDT

Notes: (Same as: Melatonin)

Start Date: 9/10/17

Stop Date: 17

Status: Discontinuedondansetron

4 mg, 2 mL, Route: IVP, Drug form: INJ, Q8H, Dosing Weight 34.091, kg, PRN 
Nausea &amp; Vomiting, Start date: 09/10/17 18:48:00 CDT, Duration: 30 day, 
Stop date: 10/10/17 18:47:00 CDT

Notes: (Same as: Zofran)  *** MEDICATION WASTE ***Product Size:  4 mgProduct 
Wasted:  0 mg

Start Date: 9/10/17

Stop Date: 17

Status: DiscontinuedoxyCODONE 5 mg immediate release

5 mg, 1 tab, Route: PO, Drug form: TAB, Q4H, Dosing Weight 34.091, kg, PRN Pain 
Score 7-10, Start date: 09/10/17 18:48:00 CDT, Duration: 30 day, Stop date: 10/
10/17 18:47:00 CDT

Notes: (Same as: Roxicodone)

Start Date: 9/10/17

Stop Date: 17

Status: Discontinuedpolyethylene glycol 3350

17 gm, 1 pkt, Route: PO, Drug form: PWDR, Daily, Dosing Weight 34.091, kg, 
Start date: 17 9:00:00 CDT, Duration: 30 day, Stop date: 10/10/17 9:00:00 
CDT

Notes: Dissolve in 8 oz of water or juice.(Same as: Miralax)

Start Date: 17

Stop Date: 17

Status: DiscontinuedProtonix

40 mg, 1 tab, Route: PO, Drug form: ECTAB, Before Dinner, Dosing Weight 34.091, 
kg, Start date: 17 16:30:00 CDT, Duration: 30 day, Stop date: 10/10/17 16:
30:00 CDT

Notes: Tablet should not be chewed or crushed.(Same as: Protonix)

Start Date: 17

Stop Date: 17

Status: DiscontinuedProtonix

40 mg, Route: PO, Drug form: ECTAB, Daily, Dosing Weight 33.182, kg, Start date
: 17 19:14:00 CDT, Duration: 30 day, Stop date: 10/13/17 9:00:00 CDT

Start Date: 17

Stop Date: 17

Status: Discontinuedsenna

17.2 mg, 2 tab, Route: PO, Drug Form: TAB, Dosing Weight 34.091, kg, Bedtime, 
Start date: 09/10/17 21:00:00 CDT, Duration: 30 day, Stop date: 10/09/17 21:00:
00 CDT

Notes: (Same as: Senokot)

Start Date: 9/10/17

Stop Date: 17

Status: Discontinuedtramadol

50 mg, 1 tab, Route: PO, Drug form: TAB, Q6Hnow, Dosing Weight 34.091, kg, PRN 
Pain Score 4-6, Startdate: 09/10/17 18:48:00 CDT, Duration: 30 day, Stop date: 
10/10/17 18:47:00 CDT

Notes: Not to exceed 400mg/day. (Same As: Ultram)

Start Date: 9/10/17

Stop Date: 17

Status: Discontinuedtramadol 50 mg oral tablet

50 mg=1 tab, PO, Q6Hnow, PRN Pain Score 4-6, X 7 day, # 28 tab, 0 Refill(s)

Start Date: 17

Stop Date: 17

Status: Ordered



Results

ELECTROLYTES





 Most recent to oldest [Reference Range]:  1

 

 Sodium Lvl [135-145 mEq/L]  138 mEq/L



   (17 5:37 AM)

 

 Potassium Lvl [3.5-5.1 mEq/L]  3.7 mEq/L



   (17 5:37 AM)

 

 Chloride Lvl [ mEq/L]  102 mEq/L



   (17 5:37 AM)

 

 CO2 [24-32 mEq/L]  25 mEq/L



   (17 5:37 AM)

 

 AGAP [10.0-20.0 mEq/L]  14.7 mEq/L



   (17 5:37 AM)



CHEM PANEL





 Most recent to oldest [Reference Range]:  1

 

 Creatinine Lvl [0.50-1.40 mg/dL]  0.92 mg/dL



   (17 5:37 AM)

 

 eGFR  56 mL/min/1.73m2 1



   *NA*



   (17 5:37 AM)

 

 BUN [7-22 mg/dL]  25 mg/dL



   *HI*



   (17 5:37 AM)

 

 Glucose Lvl [70-99 mg/dL]  90 mg/dL



   (17 5:37 AM)

 

 Calcium Lvl [8.5-10.5 mg/dL]  8.7 mg/dL



   (17 5:37 AM)

 

 Phosphorus [2.5-4.5 mg/dL]  2.9 mg/dL



   (17 5:37 AM)

 

 Magnesium Lvl [1.8-2.4 mg/dL]  2.2 mg/dL



   (17 5:37 AM)

 

 Vitamin D, 25-OH, Total [30.0-100.0 ng/mL]  8.9 ng/mL



   *LOW*



   (17 10:19 AM)



1Result Comment: The eGFR is calculated using the CKD-EPI formula. In most young
, healthy individualsthe eGFR will be >90 mL/min/1.73m2. The eGFR declines with 
age. An eGFR of 60-89 may be normal in some populations, particularly the 
elderly, for whom the CKD-EPI formula has not been extensively validated. Use 
of the eGFR is not recommended in the following populations:



Individuals with unstable creatinine concentrations, including pregnant 
patients and those with serious co-morbid conditions.



Patients with extremes in muscle mass or diet.



The data above are obtained from the National Kidney Disease Education Program (
NKDEP) which additionally recommends that when the eGFR is used in patients 
with extremes of body mass index for purposesof drug dosing, the eGFR should be 
multiplied by the estimated BMI.PARATHYROID PROFILE





 Most recent to oldest [Reference Range]:  1

 

 Ca Ion WB [1.05-1.25 mMol/L]  1.12 mMol/L



   (17 10:19 AM)

 

 Ca Norm WB [1.05-1.25 mMol/L]  1.14 mMol/L



   (17 10:19 AM)

 

 PTH Intact [11.1-79.5 pg/mL]  86.6 pg/mL



   *HI*



   (17 10:19 AM)



HEMATOLOGY





 Most recent to oldest [Reference Range]:  1

 

 WBC [3.7-10.4 K/CMM]  8.0 K/CMM



   (17 5:37 AM)

 

 RBC [4.20-5.40 M/CMM]  3.30 M/CMM



   *LOW*



   (17 5:37 AM)

 

 Hgb [12.0-16.0 g/dL]  10.1 g/dL



   *LOW*



   (17 5:37 AM)

 

 Hct [36.0-48.0 %]  28.8 %



   *LOW*



   (17 5:37 AM)

 

 MCV [80.0-98.0 fL]  87.3 fL



   (17 5:37 AM)

 

 MCH [27.0-31.0 pg]  30.6 pg



   (17 5:37 AM)

 

 MCHC [32.0-36.0 g/dL]  35.1 g/dL



   (17 5:37 AM)

 

 RDW [11.5-14.5 %]  17.8 %



   *HI*



   (17 5:37 AM)

 

 Platelet [133-450 K/CMM]  215 K/CMM



   (17 5:37 AM)

 

 MPV [7.4-10.4 fL]  8.3 fL



   (17 5:37 AM)

 

 Segs [45.0-75.0 %]  71.9 %



   (17 5:37 AM)

 

 Lymphocytes [20.0-40.0 %]  18.4 %



   *LOW*



   (17 5:37 AM)

 

 Monocytes [2.0-12.0 %]  7.9 %



   (17 5:37 AM)

 

 Eosinophils [0.0-4.0 %]  1.3 %



   (17 5:37 AM)

 

 Basophils [0.0-1.0 %]  0.5 %



   (17 5:37 AM)

 

 Segs-Bands # [1.5-8.1 K/CMM]  5.7 K/CMM



   (17 5:37 AM)

 

 Lymphocytes # [1.0-5.5 K/CMM]  1.5 K/CMM



   (17 5:37 AM)

 

 Monocytes # [0.0-0.8 K/CMM]  0.6 K/CMM



   (17 5:37 AM)

 

 Eosinophils # [0.0-0.5 K/CMM]  0.1 K/CMM



   (17 5:37 AM)







Immunizations

No data available for this section



Procedures







 Procedure  Date  Related Diagnosis  Body Site

 

 Closure of gastric ulcer  1969    







Social History







 Social History Type  Response

 

 Smoking Status  Never smoker; Exposure to Tobacco Smoke None; Cigarette Smoking



   Last 365 Days No; Reg Smoking Cessation Counseling No







Assessment and Plan

Extracted from:





 Title: Progress Note  Author: Henrry Rubalcava MD  Date: 17









  Assessment/Plan



 



 



 



 85 yo with PUD, HTN, breast ca (currently in remission) p/w persistent back 
pain s/p fall. found to have severe chronic T12 compression deformity with 
severe spinal canal stenosis.



 



 1.Compression fracture of body of thoracic vertebra



    no surgical intervention per ortho spine. no need for brace. f/u with 
Dr. Webb in 1-2 weeks.



 



   2.Humeral fracture



    NWB RUE. sling for immobilization. f/u w Dr. Gamboa [phone] to 
schedule appointment on .



 



   3.Severe malnutrition



    encourage po intake. cont calcium carbonate 600 mg/vitamin D3 BID. 
ergocalciferol 50K weekly x 12 weeks. call [phone] to schedule appointment 
with [facility] with Dr. Earle rowan 3-4 weeks for further osteoporosis evaluation.



 



   4.HTN (hypertension)



    pt expressing orthostatic symptoms. d/c amlodipine 10 mg daily.



 



   5.GERD (gastroesophageal reflux disease)



    cont protonix.



 



   6.Acute pain



    tylenol & tramadol prn. bm regimen.



 



   7.Breast ca



    currently in remission. will defer to OP oncologist.



 



 



 



 



 



   Prophylaxis



   lovenox



 



    Disposition



   home with home PT/OT pending PT clearance.





Extracted from:





 Title: Bone Health  Author: Tory Bahena  Date: 17









 Attending: Allen Herndon MD  Phone: [phone] Service: Internal 
Medicine



 Code status: None Specified=FULL CODE



 Reason for Admission: COMPRESSION THORACIC FX,HUMERUS FX,S/P FALL



 Working DRG: None Documented



 Isolation: None Documented



 Consulting Physicians: (none on file)



 



 



 CHIEF COMPLAINT: R humerus, T12, and L1 fragility fracture



 



 



 HISTORY OF PRESENT ILLNESS: Ms. Stewart is an 86 year old postmenopausal 
 woman w/ PMH significant for HTN, GERd (with history of ulcer), and 
history of breast cancer who presented to the ED as a



  transfer from an OSH in [place] on 09/10/17. The history is obtained 
from the patient, her son, and through chart review. She reportedly was at home 
and fell on Thursday, 17, when she trippe



 d over her dog in the middle of the night. She fell onto her R shoulder and 
had immediate pain so went to the local ED in [place]. She was given a 
cuff and collar sling for the humerus fracture and



  sent home. She then returned to the ED on 09/10/17 for persistant pain and 
was noted at that time to also have the T12 and L1 VCF so was transferred to [facility].



 



 Previous history of fracture: Denies



 Family history of fracture/osteoporosis: Denies



 Previous DEXA: Denies



 Prescription medication for osteoporosis/penia: Denies



 Supplements: Denies. Per son, she has had a very poor diet over the last 
several years and has lost over 10 pounds. They have sought dietary counseling 
for her but have been unsuccessful thus far in improving this.



 Physical Activity: Patient denies use of ambulatory aids at baseline. She has 
a caregiver that comes to the house daily to help with showering, cleaning, and 
other ADLs. She also has another caregiver t



 hat comes once per week to help with gardening and other recreational 
activities.



 



 REVIEW OF SYSTEMS:



 CONSTITUTIONAL: No weight loss, fever, chills, weakness or fatigue.



 HEENT: Eyes: No visual loss, blurred vision, double vision. Ears, Nose, Throat
: No hearing loss, sneezing, congestion, no sore throat.



 SKIN: No rash or itching.



 CARDIOVASCULAR: No chest pain, chest pressure or chest discomfort.



 RESPIRATORY: No SOB. No coughing.



 GASTROINTESTINAL: No loss of appetite, nausea, vomiting or diarrhea. No 
abdominal pain or bloody stools.



 GENITOURINARY: No dysuria, hematuria, polyuria.



 NEUROLOGICAL: No headache, dizziness, syncope, paralysis, ataxia, numbness or 
tingling in the extremities. No change in bowel or bladder control.



 MUSCULOSKELETAL: See HPI.



 HEMATOLOGIC: No easy bleeding or bruising.



 LYMPHATICS: No enlarged nodes.



 PSYCHIATRIC: No history of depression or anxiety.



 ENDOCRINOLOGIC: No reports of sweating, cold or heat intolerance. No polyuria 
or polydipsia.



 



 PAST MEDICAL HISTORY:



 See HPI.



 HTN (hypertension)



 GERD (gastroesophageal reflux disease)



 Breast cancer



 



 FAMILY HISTORY:



 See HPI.



 No qualifying data available



 



 SOCIAL HISTORY:



 See HPI.



 Tobacco



 Details: Use: Never smoker.  Tobacco smoke exposure: None.  Did the Patient 
Smoke Cigarettes Anytime During the Last 365 Days? No.  Cessation Counseling 
Provided? No.



 



 



 Vitals Tmp(F) Pulse BP RR SpO2 FIO2



  08:56 98.8 95 138/77 18 96 ---



  03:43 96.2 89 95/60 20 96 ---



 09/10 22:45 98.2 92 116/69 20 96 ---



 09/10 20:03 ---- 109 92/53 22 98 ---



 09/10 19:39 ---- 106 141/65 39 98 ---



 



 



 24 Hr Tmax: 98.8F (37.11c) at  08:56  Vital Signs are the last 5 in the 
past 48 hours.



 



 Date Wt(kg) Wt(lb) Ht(cm) Ht(in) Method



 09/10 (initial)  33.18   73.00 149.86  59.00 Estimated



 



 PHYSICAL EXAM:



 CONSTITUTIONAL: 86 year old  woman laying in bed in NAD. Appears of 
stated age but thin and frail. Son at bedside.



 PSYCHOLOGICAL: Appropriate mood and affect. Alert and oriented.



 HEENT: Normocephalic, atraumatic.



 RESPIRATORY: Breathing is even and nonlabored on RA.



 MUSCULOSKELETAL: No gross deformities. No involuntary movements. No sling or 
back brace in place.



 NEUROLOGICAL: Grossly neurovascularly intact in BLE and BUE.



 



  24hr Labs



  0537



 Glucose Lvl 90



 BUN 25   H



 Creatinine Lvl 0.92



 Sodium Lvl 138



 Potassium Lvl 3.7



 Chloride Lvl 102



 CO2 25



 AGAP 14.7



 Calcium Lvl 8.7



 eGFR 56



 Magnesium Lvl 2.2



 Phosphorus 2.9



 WBC 8.0



 RBC 3.30   L



 Hgb 10.1   L



 Hct 28.8   L



 MCV 87.3



 MCH 30.6



 MCHC 35.1



 RDW 17.8   H



 Platelet 215



 MPV 8.3



 Segs 71.9



 Monocytes 7.9



 Lymphocytes 18.4   L



 Eosinophils 1.3



 Basophils 0.5



 Segs-Bands # 5.7



 Lymphocytes # 1.5



 Monocytes # 0.6



 Eosinophils # 0.1



 



 IMAGING:



 Radiology Report



 EXAM: XR RIGHT FOREARM 2 VIEWS



 



 DATE: 9/10/2017 3:15 PM CDT



 



 IMPRESSION:



 1. Soft tissue swelling of the distal arm/elbow/proximal forearm without 
underlying acute osseous abnormality.



 2. Diffuse osteopenia.



 



 Radiology Report



 EXAM: XR RIGHT SHOULDER 3 VIEWS



 



 DATE: 9/10/2017 3:15 PM CDT



 



 IMPRESSION:



 1. Mildly comminuted proximal right humeral fracture with fractures through 
the surgical neck and greater tuberosity. Alignment of the glenohumeral joint 
is normal.



 



 Radiology Report



 EXAM: CT CERVICAL SPINE WITHOUT CONTRAST



 EXAM: CT THORACIC SPINE WITHOUT CONTRAST



 



 DATE: 9/10/2017 4:21 PM CDT



 



 IMPRESSION:



 1.  Chronic severe T12 vertebral compression deformity, vertebra plana, with 
about 5 mm retropulsion of the vertebral body into the spinal canal causing 
severe spinal canal stenosis and cord compression.



 2.  Chronic appearing mild superior endplate compression deformities of T5, T6 
vertebral bodies without significant height loss or retropulsion.



 3.  Subtle superior endplate compression fracture of L1 is partially 
visualized and will be described on the lumbar spine CT.



 4.  Opacification of the right sphenoid sinus.



 5.  Multiple hypodense lesions within the liver, incompletely evaluated. 
Recommend dedicated liver protocol CT/magnetic resonance imaging for further 
evaluation.



 



 Radiology Report



 EXAM: CT LUMBAR SPINE WITHOUT CONTRAST



 



 DATE: 9/10/2017 3:08 PM CDT



 



 



 IMPRESSION:



 1. Acute superior endplate compression fracture of L1 vertebra without 
significant height loss or retropulsion. This finding is communicated to 
emergency medicine resident Dr. Ceballos on 09/10/2017 at 1819 hours.



 2. Chronic severe T12 vertebral compression deformity, vertebra plana with 
about 5 mm retropulsion of the vertebra into the spinal canal causing severe 
spinal canal stenosis and cord compression.



 3. Mild degenerative changes of the lumbar spine with L3-L4 degenerative disc 
disease.



 



 ASSESSMENT:



 1. Acute R humerus fragility fracture and L1 VCF



 2. Chronic T12 VCF



 3. Clinical osteoporosis: Supported by radiographic findings noted above



 



 PLAN:



 1. Labs: Vitamin D and PTH level pending.



 2. Medication: Calcium carbonate 500mg/Vitamin D3 400 IU BID.



 3. Pain control: Per primary.



 4. Antibiotics: Per primary.



 5. PT/OT: As ordered. Weightbearing status per ortho.



 6. Education: Counseled patient and family on etiology of bone disease and 
importance of fall prevention, bone health, and appropriate treatment options. 
Educational handout given.



 7. Discharge: Per primary. Follow up in [facility] ([phone]) or 
with PCP, Dr. Earle Martin, in [place] as outpatient 3-4 weeks after 
discharge.



 



 Please call [phone] with any questions or concerns.



 



 Tory Bahena PA-C



 Fragility Fracture 









 Addendum by Tory Bahena on  Bone Health labs reviewed:



 2017 15:21  1. Vitamin D deficiency: 8.9 - Will add ergocalciferol 50,
000 IU weekly x 12 weeks



   2. Hyperparathyroidism: 86.6 - likely secondary due to vitamin D deficiency



   



   Patient will need the following as outpatient for additional bone health 
work up:



   1. DXA



   2. Labs: BSAP, NTx, TSH, T4



   3. Repeat Vitamin D level and PTH in 3 months





Extracted from:





 Title: History and Physical  Author: Kenn Gaviria MD  Date: 9/10/17









  Assessment/Plan



 



 



 



 86-year-old female with a past medical history of GERD, stomach ulcers, 
hypertension,breast cancer thought to be in remission who presents status 
post fall on Thursdayfound to have T12 compression fracture and right humeral 
fracture



 



 1.Compression fracture of body of thoracic vertebra



     Management as per orthopedic spine planning possible intervention



 



   2.Humeral fracture



     Management as per orthopedic surgery planning possible intervention



 



   3.Severe malnutrition



    Substance by significant weight lossand muscle wasting



 



 



 Follow BMP for signs of refeeding have orderedrenal insurance to see if the 
patient will drink this as opposed to the other ones



 



   4.HTN (hypertension)



    Increased home Norvasc to 10 mg as needed hydralazine



 



   5.GERD (gastroesophageal reflux disease)



    Started on Protonix



 



   6.Acute pain



    Multi multiple pain regimen with Tylenol Ultram and antispasmodics



 



 



 Bowel regimen



 



   7.Breast ca



    Thought to be in remission



 



 



 



 



 



   Prophylaxis



   Started on Lovenox



 



    Disposition



   Pending orthopedic evaluation and intervention as well as physical and 
occupational therapy evaluation



 



 



 



 



 



 Hospitalist is primary please page

## 2019-07-17 NOTE — XMS REPORT
Clinical Summary

 Created on:2019



Patient:Delia Stewart

Sex:Female

:1931

External Reference #:[id number]





Demographics







 Address  [address] [place] [address]

 

 Home Phone  [phone]

 

 Mobile Phone  [phone]

 

 Email Address  [email or web address]

 

 Preferred Language  English

 

 Marital Status  

 

 Zoroastrianism Affiliation  Unknown

 

 Race  White

 

 Ethnic Group  Not  or 









Author







 Organization  [facility]

 

 Address  [address] [place] [address]









Support







 Name  Relationship  Address  Phone

 

 Frank Stewart  Child  Unavailable  [phone]









Care Team Providers







 Name  Role  Phone

 

 Asked, No Pcp  Primary Care Provider  Unavailable









Allergies

Not on File



Medications

No known medications



Active Problems







 Problem  Noted Date

 

 Age-related osteoporosis with current pathological fracture  10/26/2017

 

 Thoracic compression fracture, closed, initial encounter  10/04/2017

 

 Acute low back pain  2017

 

 Closed displaced fracture of surgical neck of right humerus  2017

 

 Thoracic compression fracture  2017







Social History







 Tobacco Use  Types  Packs/Day  Years Used  Date

 

 Never Assessed        









 Sex Assigned at Birth  Date Recorded

 

 Not on file  









 Job Start Date  Occupation  Industry

 

 Not on file  Not on file  Not on file









 Travel History  Travel Start  Travel End









 No recent travel history available.







Last Filed Vital Signs

Not on file



Plan of Treatment







 Health Maintenance  Due Date  Last Done  Comments

 

 SHINGLES VACCINES (#1)  1981    

 

 65+ PNEUMOCOCCAL VACCINE (1 of 2 - PCV13)  1996    

 

 INFLUENZA VACCINE  2019    







Results

Not on fileafter 2018



Insurance







 Payer  Benefit Plan /  Subscriber ID  Effective Dates  Phone  Address  Type



   Group          

 

 MEDICARE  MEDICARE PART A  xxxxxxxxxx  1996-Present    [place], [place]  
Medicare



   AND B          

 

 [facility] MED  xxxxxxxxxxxx  2005-Present      [employer]mercial



   SUPPLEMENT          









 Guarantor Name  Account Type  Relation to  Date of Birth  Phone  Billing



     Patient      Address

 

 Delia Stewart  Personal/Family  Self  1931  [phone]  [address] [place] [address]







         ([place])  [place] [address]







Advance Directives

Patient has advance care planning documents on file. For more information, 
please contact:[facility] [place] [address]

## 2019-07-17 NOTE — ER
Nurse's Notes                                                                                     

 [facility]                                                                 

Name: Delia Stewart                                                                               

Age: 88 yrs                                                                                       

Sex: Female                                                                                       

: 1931                                                                                   

MRN: [id number]                                                                                   

Arrival Date: 2019                                                                          

Time: 10:01                                                                                       

Account#: [id number]                                                                            

Bed 6                                                                                             

Private MD:                                                                                       

Diagnosis: Constipation, unspecified;Dehydration;Urinary tract infection, site not specified      

                                                                                                  

Presentation:                                                                                     

                                                                                             

10:06 Presenting complaint: EMS states: At [facility], became pale, cool and diaphoretic,  ph  

      c/o nausea, BP 80s systolic, no meds given. Transition of care: patient was not             

      received from another setting of care. Onset of symptoms was 2019. Risk            

      Assessment: Do you want to hurt yourself or someone else? Patient reports no desire to      

      harm self or others. Initial Sepsis Screen: Does the patient meet any 2 criteria? HR >      

      90 bpm. Does the patient have a suspected source of infection? No. Patient's initial        

      sepsis screen is negative. Care prior to arrival: IV initiated. 22 GA, in the left hand.    

10:06 Method Of Arrival: EMS: [facility]                                                ph  

10:06 Acuity: [facility] 3                                                                           ph  

                                                                                                  

Historical:                                                                                       

- Allergies:                                                                                      

10:10 Ferrous Sulfate;                                                                        ph  

10:10 Nefazodone;                                                                             ph  

10:10 Opioids - Morphine Analogues;                                                           ph  

10:10 Opioids-Meperidine \T\ Related;                                                           ph

10:10 Opioids-Methadone \T\ Related;                                                            ph

10:10 Propoxyphene HCl;                                                                       ph  

10:10 Trazodone;                                                                              ph  

- PMHx:                                                                                           

10:10 Cancer, Breast; GERD; Hypertension; Hypothyroidism;                                     ph  

                                                                                                  

- Immunization history:: Adult Immunizations unknown.                                             

- Social history:: Smoking status: Patient/guardian denies using tobacco.                         

- Ebola Screening: : No symptoms or risks identified at this time.                                

- Family history:: not pertinent.                                                                 

- Hospitalizations: : No recent hospitalization is reported.                                      

- History obtained from: EMS.                                                                     

                                                                                                  

                                                                                                  

Screening:                                                                                        

10:59 Abuse screen: Denies threats or abuse. Denies injuries from another. Nutritional        ph  

      screening: No deficits noted. Tuberculosis screening: No symptoms or risk factors           

      identified. Fall Risk No fall in past 12 months (0 pts). No secondary diagnosis (0          

      pts). IV access (20 points). Ambulatory Aid- None/Bed Rest/Nurse Assist (0 pts). Gait-      

      Weak (10 pts.). Mental Status- Overestimates/Forgets Limitations (15 pts.). Total Bright     

      Fall Scale indicates High Risk Score (45 or more points). Fall prevention measures have     

      been instituted. Side Rails Up X 2 Placed Close to Nursing Station Frequent                 

      Obs/Assessments Occuring Family Present and informed to notify staff if the need to         

      leave the bedside As available patient and family educated on Fall Prevention Program       

      and Strategies.                                                                             

                                                                                                  

Assessment:                                                                                       

11:00 General: Appears in no apparent distress. comfortable, slender, well groomed, Behavior  ph  

      is calm, cooperative, appropriate for age, Denies fever, feeling ill. Pain: Complains       

      of pain in right upper quadrant. Neuro: Level of Consciousness is awake, alert, obeys       

      commands, Oriented to person, place. Cardiovascular: Capillary refill < 3 seconds in        

      bilateral fingers Patient's skin is warm and dry. Respiratory: Airway is patent             

      Respiratory effort is even, unlabored, Respiratory pattern is regular, symmetrical. GI:     

      Reports upper abdominal pain, nausea, Patient currently denies diarrhea, vomiting.          

      Derm: Skin is intact, is fragile, is thin, Skin is pink, warm \T\ dry. Musculoskeletal:     

      Circulation, motion, and sensation intact. Range of motion: intact in all extremities.      

11:33 Reassessment: Patient appears in no apparent distress at this time. Patient and/or      ph  

      family updated on plan of care and expected duration. Pain level reassessed. Pt             

      assisted onto bedpan, urine sample obtained, pt reports that RUQ pain has resolved,         

      denies nausea a t this time, VSS.                                                           

11:50 Reassessment: Pt taken to CT via stretcher.                                             ph  

13:36 Reassessment: Patient appears in no apparent distress at this time. Patient and/or      ph  

      family updated on plan of care and expected duration. Pain level reassessed. Report         

      called to Tod MEDEL at [facility], pt transported back to facility by POV.            

                                                                                                  

Vital Signs:                                                                                      

10:08 BP 96 / 65; Pulse 95; Resp 18; Temp 97.2; Pulse Ox 96% on R/A; Weight 43.09 kg;         ph  

11:15  / 60; Pulse 93; Resp 18; Pulse Ox 97% on R/A; Pain 0/10;                         ph  

12:30  / 64; Pulse 94; Resp 16; Pulse Ox 98% on R/A;                                    ph  

13:37  / 78; Pulse 91; Resp 18; Temp 97.8; Pulse Ox 97% on R/A; Pain 0/10;              ph  

                                                                                                  

ED Course:                                                                                        

10:01 Patient arrived in ED.                                                                  em1 

10:04 Philipp Schwartz MD is Attending Physician.                                                rn  

10:05 Liss Myrick, RN is Primary Nurse.                                                    ph  

10:08 Triage completed.                                                                       ph  

10:11 Arm band placed on Patient placed in an exam room, on a stretcher, on cardiac monitor,  ph  

      on pulse oximetry.                                                                          

10:50 Inserted saline lock: 22 gauge in left forearm, using aseptic technique. Blood          ph  

      collected.                                                                                  

11:00 Patient has correct armband on for positive identification. Placed in gown. Bed in low  ph  

      position. Call light in reach. Side rails up X2. Pulse ox on. NIBP on. Door closed.         

      Noise minimized. Warm blanket given. Head of bed elevated.                                  

12:13 CT Abd/Pelvis - IV Contrast Only In Process Unspecified.                                EDMS

13:37 No provider procedures requiring assistance completed. IV discontinued, intact,         ph  

      bleeding controlled, No redness/swelling at site. Pressure dressing applied.                

                                                                                                  

Administered Medications:                                                                         

11:32 Drug: NS 0.9% 1000 ml Route: IV; Rate: 1000 ml; Site: left forearm;                     ph  

13:45 Follow up: Response: No adverse reaction; IV Status: Completed infusion; IV Intake:     ph  

      1000ml                                                                                      

11:33 Drug: Zofran 4 mg Route: IVP; Site: left antecubital;                                   ph  

12:00 Follow up: Response: No adverse reaction                                                hb  

13:35 Drug: Macrobid 100 mg Route: PO;                                                        ph  

13:46 Follow up: Response: No adverse reaction                                                ph  

                                                                                                  

                                                                                                  

Intake:                                                                                           

13:45 IV: 1000ml; Total: 1000ml.                                                              ph  

                                                                                                  

Outcome:                                                                                          

13:15 Discharge ordered by MD.                                                                rn  

13:40 Discharged to nursing home. Report called to  Tod MEDEL                                ph  

13:40 Condition: good                                                                             

13:40 Discharge instructions given to nursing home, Instructed on discharge instructions,         

      follow up and referral plans. medication usage, Prescriptions given X 1.                    

13:46 Patient left the ED.                                                                    ph  

                                                                                                  

Signatures:                                                                                       

Dispatcher [employer]                           EDMS                                                 

Philipp Schwartz MD MD rn Martinez, Eric                               em1                                                  

Liss Myrick, DAE                      RN   ph                                                   

Roasmaria Veloz RN                     RN   hb                                                   

                                                                                                  

**************************************************************************************************

## 2019-07-17 NOTE — EDPHYS
Physician Documentation                                                                           

 [facility]                                                                 

Name: Delia Stewart                                                                               

Age: 88 yrs                                                                                       

Sex: Female                                                                                       

: 1931                                                                                   

MRN: [id number]                                                                                   

Arrival Date: 2019                                                                          

Time: 10:01                                                                                       

Account#: [id number]                                                                            

Bed 6                                                                                             

Private MD:                                                                                       

ED Physician Philipp Schwartz                                                                         

HPI:                                                                                              

                                                                                             

10:19 This 88 yrs old  Female presents to ER via EMS with complaints of low blood     rn  

      pressure.                                                                                   

10:19 Per EMS report, sent from nursing home for low blood pressure, had reported early was   rn  

      having nausea and abd pain, but denies to me. She states feels fine other than "being       

      crazy". Denies focal neuro complaint, no chest pain/sob/abd pain/vomiting/diarrhea. NO      

      blood in stools. . Onset: The symptoms/episode began/occurred at an unknown time.           

      Severity of symptoms: At their worst the symptoms were mild in the emergency department     

      the symptoms have improved. It is unknown whether or not the patient has had similar        

      symptoms in the past. The patient has not recently seen a physician.                        

                                                                                                  

Historical:                                                                                       

- Allergies:                                                                                      

10:10 Ferrous Sulfate;                                                                        ph  

10:10 Nefazodone;                                                                             ph  

10:10 Opioids - Morphine Analogues;                                                           ph  

10:10 Opioids-Meperidine \T\ Related;                                                           ph

10:10 Opioids-Methadone \T\ Related;                                                            ph

10:10 Propoxyphene HCl;                                                                       ph  

10:10 Trazodone;                                                                              ph  

- PMHx:                                                                                           

10:10 Cancer, Breast; GERD; Hypertension; Hypothyroidism;                                     ph  

                                                                                                  

- Immunization history:: Adult Immunizations unknown.                                             

- Social history:: Smoking status: Patient/guardian denies using tobacco.                         

- Ebola Screening: : No symptoms or risks identified at this time.                                

- Family history:: not pertinent.                                                                 

- Hospitalizations: : No recent hospitalization is reported.                                      

- History obtained from: EMS.                                                                     

                                                                                                  

                                                                                                  

ROS:                                                                                              

10:19 Constitutional: Negative for fever, chills, and weight loss, Eyes: Negative for injury, rn  

      pain, redness, and discharge, Neck: Negative for injury, pain, and swelling,                

      Cardiovascular: Negative for chest pain, palpitations, and edema, Respiratory: Negative     

      for shortness of breath, cough, wheezing, and pleuritic chest pain, Abdomen/GI:             

      Negative for abdominal pain, nausea, vomiting, diarrhea, and constipation,                  

      MS/Extremity: Negative for injury and deformity, Skin: Negative for injury, rash, and       

      discoloration, Neuro: Negative for headache, weakness, numbness, tingling, and seizure.     

                                                                                                  

Exam:                                                                                             

10:19 Constitutional:  This is a well developed, well nourished patient who is awake, alert,  rn  

      and in no acute distress. Head/Face:  Normocephalic, atraumatic. Eyes:  Pupils equal        

      round and reactive to light, extra-ocular motions intact.  Lids and lashes normal.          

      Conjunctiva and sclera are non-icteric and not injected.  Cornea within normal limits.      

      Periorbital areas with no swelling, redness, or edema. ENT:  MMM Cardiovascular:            

      Regular rate and rhythm. + loud systolic murmur.  No pulse deficits. Respiratory:           

      Lungs have equal breath sounds bilaterally, clear to auscultation.  No increased work       

      of breathing, no retractions or nasal flaring. Abdomen/GI:  soft, non-tender MS/            

      Extremity:  Pulses equal, no cyanosis.  Neurovascular intact.  Full, normal range of        

      motion.  Equal circumference. Neuro:  Awake and alert, GCS 15, oriented to person,          

      situation, not place (thinks is [place]).  Cranial nerves II-XII grossly intact.           

      Motor strength 5/5 in all extremities.  Sensory grossly intact.  Cerebellar exam            

      normal.                                                                                     

                                                                                                  

Vital Signs:                                                                                      

10:08 BP 96 / 65; Pulse 95; Resp 18; Temp 97.2; Pulse Ox 96% on R/A; Weight 43.09 kg;         ph  

11:15  / 60; Pulse 93; Resp 18; Pulse Ox 97% on R/A; Pain 0/10;                         ph  

12:30  / 64; Pulse 94; Resp 16; Pulse Ox 98% on R/A;                                    ph  

13:37  / 78; Pulse 91; Resp 18; Temp 97.8; Pulse Ox 97% on R/A; Pain 0/10;              ph  

                                                                                                  

MDM:                                                                                              

10:04 Patient medically screened.                                                             rn  

13:12 Differential Diagnosis constipation, UTI, dehydration. Data reviewed: vital signs,      rn  

      nurses notes, lab test result(s), radiologic studies, CT scan, and as a result, I will      

      discharge patient. Counseling: I had a detailed discussion with the patient and/or          

      guardian regarding: the historical points, exam findings, and any diagnostic results        

      supporting the discharge/admit diagnosis, lab results, radiology results, the need for      

      outpatient follow up, to return to the emergency department if symptoms worsen or           

      persist or if there are any questions or concerns that arise at home. Response to           

      treatment: the patient's symptoms have markedly improved after treatment, and as a          

      result, I will discharge patient. Special discussion: I discussed with the                  

      patient/guardian in detail that at this point there is no indication for admission to       

      the hospital. It is understood, however, that if the symptoms persist or worsen the         

      patient needs to return immediately for re-evaluation.                                      

13:12 ED course: Pt asymptomatic, ct shows constipation, no obstruction of impaction, +       rn  

      dehydration and UTI, will dc home with abx for UTI and rehydration.BP now normalized.       

      Procal neg..                                                                                

                                                                                                  

                                                                                             

10:06 Order name: Basic Metabolic Panel; Complete Time: 13:                                 rn  

                                                                                             

10:06 Order name: CBC with Diff; Complete Time: 13:                                         rn  

                                                                                             

10:06 Order name: Creatinine for Radiology; Complete Time: 13:                              rn  

                                                                                             

10:06 Order name: Hepatic Function; Complete Time: 13:                                      rn  

                                                                                             

10:06 Order name: Lipase; Complete Time: 13:                                                rn  

                                                                                             

10:07 Order name: Troponin (emerg Dept Use Only); Complete Time: 13:13                        rn  

                                                                                             

10:07 Order name: Urine Culture                                                               rn  

                                                                                             

10:07 Order name: Urine Microscopic Only; Complete Time: 13:13                                rn  

                                                                                             

10:07 Order name: Lactate; Complete Time: 13:13                                               rn  

                                                                                             

10:09 Order name: CT Abd/Pelvis - IV Contrast Only; Complete Time: 13:13                      rn  

                                                                                             

10:09 Order name: Blood Culture Adult (2)                                                     rn  

                                                                                             

10:09 Order name: Procalcitonin; Complete Time: 13:13                                         rn  

                                                                                             

11:15 Order name: CBC Smear Scan; Complete Time: 13:13                                        EDMS

                                                                                             

11:43 Order name: Urine Dipstick--Ancillary (enter results); Complete Time: 13:13             bd  

                                                                                             

10:06 Order name: IV Saline Lock; Complete Time: 11:                                        rn  

                                                                                             

10:06 Order name: Labs collected and sent; Complete Time: 11:                               rn  

                                                                                             

10:06 Order name: EKG; Complete Time: 10:08                                                   rn  

                                                                                             

10:06 Order name: EKG - Nurse/Tech                                                            rn  

                                                                                             

10:07 Order name: Urine Dipstick-Ancillary (obtain specimen); Complete Time: 11:33            rn  

                                                                                                  

Administered Medications:                                                                         

11:32 Drug: NS 0.9% 1000 ml Route: IV; Rate: 1000 ml; Site: left forearm;                     ph  

13:45 Follow up: Response: No adverse reaction; IV Status: Completed infusion; IV Intake:     ph  

      1000ml                                                                                      

11:33 Drug: Zofran 4 mg Route: IVP; Site: left antecubital;                                   ph  

12:00 Follow up: Response: No adverse reaction                                                hb  

13:35 Drug: Macrobid 100 mg Route: PO;                                                        ph  

13:46 Follow up: Response: No adverse reaction                                                ph  

                                                                                                  

                                                                                                  

Disposition:                                                                                      

19 13:15 Discharged to Home. Impression: Constipation, unspecified, Dehydration, Urinary    

  tract infection, site not specified.                                                            

- Condition is Stable.                                                                            

- Discharge Instructions: Constipation, Adult, Dehydration, Adult, Urinary Tract                  

  Infection, Adult.                                                                               

- Prescriptions for Macrobid 100 mg Oral Capsule - take 1 capsule by ORAL route every             

  12 hours for 7 days; 14 capsule.                                                                

- Medication Reconciliation Form, Thank You Letter, Antibiotic Education, Prescription            

  Opioid Use form.                                                                                

- Follow up: Private Physician; When: As needed; Reason: Recheck today's complaints,              

  Re-evaluation by your physician.                                                                

- Problem is new.                                                                                 

- Symptoms have improved.                                                                         

                                                                                                  

                                                                                                  

                                                                                                  

Signatures:                                                                                       

Dispatcher [employer]                           Sabiha Rausch RN RN iw Nieto, Roman, MD MD rn Hall, Patricia, RN RN ph Baxter, Heather RN                                                      

                                                                                                  

Corrections: (The following items were deleted from the chart)                                    

13:46 13:15 2019 13:15 Discharged to Home. Impression: Constipation, unspecified;       ph  

      Dehydration; Urinary tract infection, site not specified. Condition is Stable. Forms        

      are Medication Reconciliation Form, Thank You Letter, Antibiotic Education,                 

      Prescription Opioid Use. Follow up: Private Physician; When: As needed; Reason: Recheck     

      today's complaints, Re-evaluation by your physician. Problem is new. Symptoms have          

      improved. rn                                                                                

                                                                                                  

**************************************************************************************************

## 2019-07-17 NOTE — XMS REPORT
Patient Summary Document

 Created on:2019



Patient:NORBERTO OLSEN

Sex:Female

:1931

External Reference #:[id number]





Demographics







 Address  [address] [place] [address]

 

 Home Phone  [phone]

 

 Work Phone  [phone]

 

 Email Address  NONE

 

 Preferred Language  Unknown

 

 Marital Status  Unknown

 

 Church Affiliation  Unknown

 

 Race  Unknown

 

 Additional Race(s)  Unavailable

 

 Ethnic Group  Unknown









Author







 Organization  [facility]

 

 Address  [address] [place] [address] [address]. [address] [place] [id number]

 

 Phone  [phone]









Care Team Providers







 Name  Role  Phone

 

 Unavailable  Unavailable  Unavailable









Problems

This patient has no known problems.



Allergies, Adverse Reactions, Alerts

This patient has no known allergies or adverse reactions.



Medications

This patient has no known medications.

## 2019-07-17 NOTE — RAD REPORT
EXAM DESCRIPTION:  CTAbdomen   Pelvis W Contrast - 7/17/2019 12:11 pm

 

CLINICAL HISTORY:  Abdominal pain.

ABD PAIN

 

COMPARISON:  Chest Abdomen Pelvis W Cont dated 3/31/2018; Head C Spine Mpr Wo Con dated 3/30/2018

 

TECHNIQUE:  Biphasic CT imaging of the abdomen and pelvis was performed with 100 ml non-ionic IV cont
rast.

 

All CT scans are performed using dose optimization technique as appropriate and may include automated
 exposure control or mA/KV adjustment according to patient size.

 

FINDINGS:  The lung bases are clear.

 

The liver contains multiple hypodense lesions compatible with cysts appearing unchanged since compara
tive study. No aggressive liver lesion or biliary dilatation suspected. The gallbladder appears surgi
grant absent. The spleen, pancreas, adrenal glands and kidneys are within normal limits.

 

No bowel obstruction, free air, free fluid or abscess. Sigmoid diverticulosis coli is present. There 
is a prominent amount stool retained in the rectosigmoid colon. The appendix is not identified as a d
iscrete structure, however, no secondary findings of appendicitis are identified.   No evidence of si
gnificant lymphadenopathy.

 

No suspicious bony findings.

 

IMPRESSION:  Prominent retained stool in the rectosigmoid colon.

 

Elsewhere, no acute abnormality is discerned.

## 2019-07-17 NOTE — XMS REPORT
Encounter CCD: 2013 to 2013

 Created on:2122



Patient:NORBERTO OLSEN

Sex:Female

:1931

External Reference #:[id number]





Demographics







 Address  [address] [place] [id number]

 

 Home Phone  [phone]

 

 Preferred Language  Unknown

 

 Marital Status  Single

 

 Tenriism Affiliation  Muslim

 

 Race  White/

 

 Ethnic Group  Other









Author







 Organization  [facility]









Care Team Providers







 Name  Role  Phone

 

 Leonel Power  Consulting Provider  [phone]









Vital Signs







 Most recent to oldest [Reference Range]:  1

 

 Height  147.32 cm



   (2013 11:12:00)

 

 Weight  43.636 kg



   (2013 11:12:00)

## 2019-07-18 NOTE — EKG
Test Date:    2019-07-17               Test Time:    12:08:40

Technician:   VINCENT                                     

                                                     

MEASUREMENT RESULTS:                                       

Intervals:                                           

Rate:         107                                    

NY:           178                                    

QRSD:         84                                     

QT:           360                                    

QTc:          480                                    

Axis:                                                

P:            65                                     

NY:           178                                    

QRS:          19                                     

T:            144                                    

                                                     

INTERPRETIVE STATEMENTS:                                       

                                                     

Sinus tachycardia

Marked ST abnormality, possible inferolateral subendocardial injury

Abnormal ECG

Compared to ECG 03/30/2018 09:40:19

ST (T wave) deviation now present

Sinus rhythm no longer present

Left ventricular hypertrophy no longer present

Early repolarization no longer present



Electronically Signed On 07-18-19 07:20:43 CDT by Harjeet Hagan

## 2020-03-09 NOTE — P.PN
Date of Service: 03/31/18





Spoke with patient and she is refusing any further intervention. She does not 

want any aggressive intervention. She had been dropping her Hgb and she has 

unknown etiology for her bleeding. She is wanting to go home in the am without 

further intervention. We have spoken to son to update him as well. positive S2/positive S1

## 2024-10-22 NOTE — XMS REPORT
Continuity of Care Document

 Created on:2019



Patient:NORBERTO STEWART

Sex:Female

:1931

External Reference #:[id number]





Demographics







 Address  [address] [place] [address]

 

 Home Phone  [phone]

 

 Preferred Language  en-US

 

 Marital Status  Unknown

 

 Restoration Affiliation  Unknown

 

 Race  Unknown

 

 Ethnic Group  Unknown









Author







 Organization  [employer]









Care Team Providers







 Name  Role  Phone

 

 [employer]  Unavailable  Unavailable









Problems







 Problem  Status  Onset  Classification  Date  Comments  Source



     Date    Reported    



             



             

 

 1ACK PAIN  Active  09/10/201        [place] [address]        Medical



             Center



             



             

 

 COMPRESSION  Active  09/10/201        [facility]



 THORACIC    7        Medical



 FX,HUMERUS            Center



 FX,S/P F            



             

 

 SYNCOPE  Active          [facility]



     3        [facility]



             Center



             



             

 

 Breast cancer  Resolved    Problem  2017    [address]



             Center



             



             

 

 GERD  Resolved    Problem  2017    [facility]



 (Confirmed)            [facility]



             



             

 

 HTN (Confirmed)  Resolved    Problem  2017    [facility]



             



             

 

 SYNCOPE AND  Active          [facility]



 COLLAPSE            [facility]



             Center



             



             

 

 COLLAPSED  Active          [facility]



 VERTEBRA, NEC,            [facility]



 THORACIC REGION            Center



             



             

 

 UNSP FRACTURE  Active          [facility]



 OF SHAFT OF            [facility]



 HUMERUS, UNSP            Center



             



             







Medications







 Medication  Details  Route  Status  Patient  Ordering  Order  Source



         Instructions  Provider  Date  



               



               



               

 

 enoxaparin 40  40 mg=0.4    Active        [facility]



 mg/0.4 mL  mL, SUB-Q,          017  Medical



 subcutaneous  [id number], X            Center



 solution  21 day, # 8            



   mL, 0            



   Refill(s),            



   Pharmacy:            



   [facility]            



   [id number]            



               



               

 

 tramadol  50 mg=1 tab,    Active        [facility]



 hydrochloride 50  PO, Q6Hnow,          017  Medical



 MG Oral Tablet  PRN Pain            Center



   Score 4-6, X            



   7 day, # 28            



   tab, 0            



   Refill(s)            



               



               

 

 Ergocalciferol  50,000    Active        [facility]



 [address] UNT Oral  IntlUnit=1          017  Medical



 Capsule  cap, PO,            Center



   Q7D, # 5            



   cap, 0            



   Refill(s),            



   Pharmacy:            



   [facility]            



   [id number]            



               



               

 

 Docusate Sodium  100 mg=1    Active        [facility]



 100 MG Oral  cap, PO,          017  Medical



 Capsule  BID, # 60            Center



   cap, 0            



   Refill(s),            



   Pharmacy:            



   [facility]            



   [id number]            



               



               

 

 Calcium Carbonate  1 tab, CHEW,    Active        [facility]



 1250 MG /  BID, # 60          017  Medical



 Cholecalciferol  tab, 0            Center



 400 UNT Chewable  Refill(s),            



 Tablet  Pharmacy:            



   [facility]            



   [facility]            



   [id number]            



               



               

 

 Ambien  5 mg, 1 tab,    Inactive        [facility]



   Route: PO,          017  Medical



   Drug form:            [place]



   TAB, ONCE,            



   Dosing            



   Weight            



   33.182, kg,            



   PRN as            



   needed for            



   insomnia,            



   Start date:            



   17            



   21:37:00            



   CDTNotes:            



   (Same As:            



   Ambien)            



               



               

 

 Maalox Advanced  30 mL,    No Longer        [facility]



 Regular Strength  Route: PO,    Active      017  Medical



 SUSP  Drug Form:            [place]



   SUSP, Dosing            



   Weight            



   33.182, kg,            



   QID, PRN            



   Heartburn,            



   Start date:            



   17            



   19:15:00            



   CDT,            



   Duration: 30            



   day, Stop            



   date:            



   10/13/17            



   19:14:00            



   CDTNotes:            



   (aluminum            



   hydroxide-ma            



   gnesium            



   hyd-simethic            



   one            



   [phone]            



   /5ml 30 ml            



   ud ISABEL)            



               



               

 

 Protonix  40 mg,    Inactive        [facility]



   Route: PO,          017  Medical



   Drug form:            [place]



   ECTAB,            



   Daily,            



   Dosing            



   Weight            



   33.182, kg,            



   Start date:            



   17            



   19:14:00            



   CDT,            



   Duration: 30            



   day, Stop            



   date:            



   10/13/17            



   9:00:00 CDT            



               



               

 

 Ergocalciferol  50,000    No Longer        [facility]



 [address] UNT Oral  IntlUnit, 1    Active      017  Medical



 Capsule  cap, Route:            [place]



   PO, Drug            



   form: CAP,            



   Q7D, Dosing            



   Weight            



   33.182, kg,            



   Start date:            



   17            



   16:00:00            



   CDT,            



   Duration: 30            



   day, Stop            



   date:            



   10/10/17            



   9:00:00            



   CDTNotes:            



   (Same as:            



   Vitamin D)            



   "Do Not            



   Crush"            



               



               

 

 Loperamide  2 mg, 10 mL,    No Longer        [facility]



   Route: PO,    Active      017  Medical



   Drug form:            [place]



   LIQ, Q4H,            



   Dosing            



   Weight            



   33.182, kg,            



   PRN as            



   needed for            



   loose stool,            



   Start date:            



   17            



   12:00:00            



   CDT,            



   Duration: 30            



   day, Stop            



   date:            



   10/12/17            



   8:00:00            



   CDTNotes:            



   Same as            



   Imodium            



               



               

 

 Ambien  5 mg, 1 tab,    No Longer        [facility]



   Route: PO,    Active      017  Medical



   Drug form:            Center



   TAB,            



   Bedtime,            



   Dosing            



   Weight            



   33.182, kg,            



   PRN            



   Insomnia,            



   Start date:            



   17            



   20:51:00            



   CDT,            



   Duration: 30            



   day, Stop            



   date:            



   10/11/17            



   20:50:00            



   CDTNotes:            



   (Same As:            



   Ambien)            



               



               

 

 Protonix  40 mg, 1    No Longer        [facility]



   tab, Route:    Active      017  Medical



   PO, Drug            Center



   form: ECTAB,            



   Before            



   Dinner,            



   Dosing            



   Weight            



   34.091, kg,            



   Start date:            



   17            



   16:30:00            



   CDT,            



   Duration: 30            



   day, Stop            



   date:            



   10/10/17            



   16:30:00            



   CDTNotes:            



   Tablet            



   should not            



   be chewed or            



   crushed.            



   (Same as:            



   Protonix)            



               



               

 

 Calcium Carbonate  1 tab,    No Longer        [facility]



 1250 MG /  Route: CHEW,    Active      [facility]  Medical



 Cholecalciferol  Drug Form:            [place]



 400 UNT Chewable  CHEWTAB,            



 Tablet  Dosing            



   Weight            



   33.182, kg,            



   BID, Start            



   date:            



   17            



   9:00:00 CDT,            



   Duration: 30            



   day, Stop            



   date:            



   10/10/17            



   17:00:00            



   CDTNotes:            



   (calcium            



   carbonate-vi            



   t D            



   500mg-400uni            



   t chew TAB)            



   Same as:            



   Oscal 500+D            



               



               

 

 Docusate  100 mg, 1    No Longer        [facility]



   cap, Route:    Active      017  Medical



   PO, Drug            Center



   form: CAP,            



   BID, Dosing            



   Weight            



   34.091, kg,            



   Start date:            



   17            



   9:00:00 CDT,            



   Duration: 30            



   day, Stop            



   date:            



   10/10/17            



   17:00:00            



   CDTNotes:            



   (Same as:            



   Colace) (Do            



   Not Crush)            



               



               

 

 POLYETHYLENE  17 gm, 1    No Longer        [facility]



 GLYCOL 3350  pkt, Route:    Active      017  Medical



   PO, Drug            Center



   form: PWDR,            



   Daily,            



   Dosing            



   Weight            



   34.091, kg,            



   Start date:            



   17            



   9:00:00 CDT,            



   Duration: 30            



   day, Stop            



   date:            



   10/10/17            



   9:00:00            



   CDTNotes:            



   Dissolve in            



   8 oz of            



   water or            



   juice. (Same            



   as: Miralax)            



               



               

 

 Ambien  5 mg, 1 tab,    Inactive        [facility]



   Route: PO,          017  Medical



   Drug form:            Center



   TAB, ONCE,            



   Dosing            



   Weight            



   33.182, kg,            



   PRN as            



   needed for            



   insomnia,            



   Priority:            



   NOW, Start            



   date:            



   17            



   0:42:00            



   CDTNotes:            



   (Same As:            



   Ambien)            



               



               

 

 sennosides, USP  17.2 mg, 2    No Longer        [facility]



   tab, Route:    Active      017  Medical



   PO, Drug            Center



   Form: TAB,            



   Dosing            



   Weight            



   34.091, kg,            



   Bedtime,            



   Start date:            



   09/10/17            



   21:00:00            



   CDT,            



   Duration: 30            



   day, Stop            



   date:            



   10/09/17            



   21:00:00            



   CDTNotes:            



   (Same as:            



   Senokot)            



               



               

 

 Enoxaparin  40 mg, 0.4    No Longer        [facility]



   mL, Route:    Active      017  Medical



   SUB-Q, Drug            Center



   form: INJ,            



   [id number],            



   Dosing            



   Weight            



   34.091, kg,            



   Start date:            



   09/10/17            



   19:00:00            



   CDT,            



   Duration: 30            



   day, Stop            



   date:            



   10/09/17            



   21:00:00            



   CDTNotes:            



   (Same as:            



   Lovenox)            



               



               

 

 Amlodipine  10 mg, 1    No Longer        [facility]



   tab, Route:    Active      017  Medical



   PO, Drug            Center



   form: TAB,            



   Daily,            



   Dosing            



   Weight            



   34.091, kg,            



   Start date:            



   09/10/17            



   19:00:00            



   CDT, Stop            



   date:            



   10/10/17            



   9:00:00            



   CDTNotes:            



   (Same as:            



   Norvasc)            



               



               

 

 Hydralazine  10 mg, 0.5    No Longer      09/10/2  [facility]



   mL, Route:    Active      017  Medical



   IV, Drug            Center



   form: INJ,            



   Q6H, Dosing            



   Weight            



   34.091, kg,            



   PRN            



   Hypertension            



   , Start            



   date:            



   09/10/17            



   18:51:00            



   CDT,            



   Duration: 30            



   day, Stop            



   date:            



   10/10/17            



   18:50:00            



   CDTNotes:            



   (Same as:            



   Apresoline)            



   Push over 5            



   minutes            



               



               

 

 Ondansetron  4 mg, 2 mL,    No Longer      09/10/2  [facility]



   Route: IVP,    Active      017  Medical



   Drug form:            Center



   INJ, Q8H,            



   Dosing            



   Weight            



   34.091, kg,            



   PRN Nausea &            



   Vomiting,            



   Start date:            



   09/10/17            



   18:48:00            



   CDT,            



   Duration: 30            



   day, Stop            



   date:            



   10/10/17            



   18:47:00            



   CDTNotes:            



   (Same as:            



   Zofran)            



   ***            



   MEDICATION            



   WASTE ***            



   Product            



   Size:  4 mg            



   Product            



   Wasted:  0            



   mg            



               

 

 Bisacodyl  10 mg, 1    No Longer      09/10/2  [facility]



   supp, Route:    Active      017  Medical



   AZ, Drug            Center



   form: SUPP,            



   Daily,            



   Dosing            



   Weight            



   34.091, kg,            



   PRN            



   Constipation            



   , Start            



   date:            



   09/10/17            



   18:48:00            



   CDT,            



   Duration: 30            



   day, Stop            



   date:            



   10/10/17            



   18:47:00            



   CDTNotes:            



   (Same As:            



   Dulcolax,            



   Bisco-Lax)            



               



               

 

 Melatonin  3 mg, 1 tab,    No Longer      09/10/2  [facility]



   Route: PO,    Active      017  Medical



   Drug form:            Center



   TAB,            



   Bedtime,            



   Dosing            



   Weight            



   34.091, kg,            



   PRN            



   Insomnia,            



   Start date:            



   09/10/17            



   18:48:00            



   CDT,            



   Duration: 30            



   day, Stop            



   date:            



   10/10/17            



   18:47:00            



   CDTNotes:            



   (Same as:            



   Melatonin)            



               



               

 

 Tramadol  50 mg, 1    No Longer      09/10/2  [facility]



   tab, Route:    Active      017  Medical



   PO, Drug            Center



   form: TAB,            



   Q6Hnow,            



   Dosing            



   Weight            



   34.091, kg,            



   PRN Pain            



   Score 4-6,            



   Start date:            



   09/10/17            



   18:48:00            



   CDT,            



   Duration: 30            



   day, Stop            



   date:            



   10/10/17            



   18:47:00            



   CDTNotes:            



   Not to            



   exceed            



   400mg/day.            



   (Same As:            



   Ultram)            



               



               

 

 Oxycodone  5 mg, 1 tab,    No Longer      09/10/2  [facility]



 Hydrochloride 5  Route: PO,    Active      017  Medical



 MG Oral Tablet  Drug form:            Center



   TAB, Q4H,            



   Dosing            



   Weight            



   34.091, kg,            



   PRN Pain            



   Score 7-10,            



   Start date:            



   09/10/17            



   18:48:00            



   CDT,            



   Duration: 30            



   day, Stop            



   date:            



   10/10/17            



   18:47:00            



   CDTNotes:            



   (Same as:            



   Roxicodone)            



               



               

 

 Acetaminophen  650 mg, 2    No Longer      09/10/2  [facility]



   tab, Route:    Active      017  Medical



   PO, Drug            Center



   form: TAB,            



   Q6H, Dosing            



   Weight            



   34.091, kg,            



   PRN Pain            



   1-3/Temp >            



   100.4 F,            



   Start date:            



   09/10/17            



   18:48:00            



   CDT,            



   Duration: 30            



   day, Stop            



   date:            



   10/10/17            



   18:47:00            



   CDTNotes: Do            



   not exceed 4            



   gm/day.            



   (Same as:            



   Tylenol)            



               



               

 

 Amlodipine  2.5 mg, PO,    No Longer      09/10/2  [facility]



   Daily, 0    Active      017  Medical



   Refill(s)            Center



               



               

 

 Amlodipine  2.5 mg, 1    Inactive      09/10/2  [facility]



   tab, Route:          017  Medical



   PO, Drug            Center



   form: TAB,            



   ONCE, Dosing            



   Weight            



   34.091, kg,            



   Start date:            



   09/10/17            



   15:29:00            



   CDT, Stop            



   date:            



   09/10/17            



   15:29:00            



   CDTNotes:            



   (Same as:            



   Norvasc)            



               



               







Allergies, Adverse Reactions, Alerts







 Substance  Category  Reaction  Severity  Reaction  Status  Date  Comments  
Source



         type    Reported    



                 



                 



                 

 

 ferrous  Assertion      Drug  Active      [facility] [place]



 sulfate        [facility]



                 



                 

 

 meperidine  Assertion      Drug  Active      [facility] [place]



                 



                 

 

 methadone  Assertion      Drug  Active      [facility] [place]



                 



                 

 

 morphine  Assertion      Drug  Active      [facility] [place] [place]



                 



                 

 

 nefazodone  Assertion      Drug  Active      [facility] [place] [place]



                 



                 

 

 propoxyphene  Assertion      Drug  Active      [facility] [place] [place]



                 



                 

 

 traZODone  Assertion      Drug  Active      [facility] [place] [place]



                 



                 







Immunizations







 No Data Provided for This Section







Results







 Order Name  Results  Value  Reference  Date  Interpretation  Comments  Source



       Range        



               



               



               

 

 CHEM PANEL  Vitamin D,  8.9  30.0 -        [facility] [place]



   25-OH, Total    100.0        [facility]



               



               



               

 

 PARATHYROID  Ca Ion WB  1.12  . -        [facility]



 PROFILE      .      [facility]



               



               



               

 

 PARATHYROID  Ca Norm WB  1.14  .      [facility]      .      [facility]



               



               



               

 

 PARATHYROID  PTH Intact  86.6  11.1 -        [facility]



 PROFILE      79.5        [facility]



               



               



               

 

 CHEM PANEL  Phosphorus  2.9  2.5 - 4.5        [facility]      [facility]



               



               



               

 

 CHEM PANEL  Magnesium Lvl  2.2  1.8 - 2.4        [address]



               



               



               

 

 ELECTROLYTES  AGAP  14.7  10.0 -        [facility]



       20.0        [facility]



               



               



               

 

 ELECTROLYTES  CO2  25  24 - 32        [address]



               



               



               

 

 ELECTROLYTES  Calcium Lvl  8.7  8.5 - 10.5        [address]



               



               



               

 

 ELECTROLYTES  eGFR  56        Result  [facility]



         [facility]    Comment: The  Medical



             eGFR is  Center



             calculated  



             using the  



             CKD-EPI  



             formula. In  



             most young,  



             healthy  



             individuals  



             the eGFR will  



             be >90  



             mL/min/1.73m2  



             . The eGFR  



             declines with  



             age. An eGFR  



             of 60-89 may  



             be normal in  



             some  



             populations,  



             particularly  



             the elderly,  



             for whom the  



             CKD-EPI  



             formula has  



             not been  



             extensively  



             validated.  



             Use of the  



             eGFR is not  



             recommended  



             in the  



             following  



             populations:<  



             br/><br/>[place]  



             viduals with  



             unstable  



             creatinine  



             concentration  



             s, including  



             pregnant  



             patients and  



             those with  



             serious  



             co-morbid  



             conditions.<b  



             r/><br/>Patie  



             nts with  



             extremes in  



             muscle mass  



             or diet.  



             <br/><br/>The  



             data above  



             are obtained  



             from the  



             National  



             Kidney  



             Disease  



             Education  



             Program  



             (NKDEP) which  



             additionally  



             recommends  



             that when the  



             eGFR is used  



             in patients  



             with extremes  



             of body mass  



             index for  



             purposes of  



             drug dosing,  



             the eGFR  



             should be  



             multiplied by  



             the estimated  



             BMI.  



               

 

 ELECTROLYTES  Potassium Lvl  3.7  3.5 - 5.1        [address]



               



               



               

 

 ELECTROLYTES  BUN  25  7 - 22        [address]



               



               



               

 

 ELECTROLYTES  Sodium Lvl  138  135 - 145        [address]



               



               



               

 

 ELECTROLYTES  Creatinine  0.92  0.50 -        [facility]



   Lvl    1.40        [facility]



               



               



               

 

 ELECTROLYTES  Glucose Lvl  90  70 - 99        [address]



               



               



               

 

 ELECTROLYTES  Chloride Lvl  102  95 - 109        [address]



               



               



               

 

 HEMATOLOGY  Lymphocytes #  1.5  1.0 - 5.5        [address]



               



               



               

 

 HEMATOLOGY  Segs-Bands #  5.7  1.5 - 8.1        [address]



               



               



               

 

 HEMATOLOGY  Segs  71.9  45.0 -         [place]



       75.0        [facility]



               



               



               

 

 HEMATOLOGY  Lymphocytes  18.4  20.0 -        [facility]



       40.0        [facility]



               



               



               

 

 HEMATOLOGY  Eosinophils  1.3  0.0 - 4.0        [address]



               



               



               

 

 HEMATOLOGY  Monocytes  7.9  2.0 - 12.0        [address]



               



               



               

 

 HEMATOLOGY  Basophils  0.5  0.0 - 1.0        [address]



               



               



               

 

 HEMATOLOGY  Monocytes #  0.6  0.0 - 0.8        [address]



               



               



               

 

 HEMATOLOGY  Eosinophils #  0.1  0.0 - 0.5         [place]



         /2017      [facility]



               



               



               

 

 HEMATOLOGY  WBC  8.0  3.7 - 10.4         [place]



         /2017      [facility]



               



               



               

 

 HEMATOLOGY  Hct  28.8  36.0 -        [facility]



       48.0        [facility]



               



               



               

 

 HEMATOLOGY  Hgb  10.1  12.0 -        [facility]



       16.0        [facility]



               



               



               

 

 HEMATOLOGY  MCV  87.3  80.0 -        [facility]



       98.0        [facility]



               



               



               

 

 HEMATOLOGY  MPV  8.3  7.4 - 10.4         [place]



         /2017      [facility]



               



               



               

 

 HEMATOLOGY  Platelet  215  133 - 450         [place]



         /2017      [facility]



               



               



               

 

 HEMATOLOGY  RDW  17.8  11.5 -        [facility]



       14.5        [facility]



               



               



               

 

 HEMATOLOGY  MCH  30.6  27.0 -        [facility]



       31.0        [facility]



               



               



               

 

 HEMATOLOGY  MCHC  35.1  32.0 -        [facility]



       36.0        [facility]



               



               



               

 

 HEMATOLOGY  RBC  3.30  4.20 -        [facility]



       5.40        [facility]



               



               



               







Pathology Reports







 No Data Provided for This Section







Diagnostic Reports







 Report  Value  Date  Source



       

 

 Spine thoracolumbar 2  EXAM: XR THORACOLUMBAR SPINE 2 VIEWS  2017  [facility]



 views DX  DATE: 2017 4:36 AM CDT    Center



   INDICATION:  - upright views without brace    



   COMPARISON: CT thoracic spine 9/10/2017    



   TECHNIQUE: AP and lateral radiographs of the thoracolumbar junction, shows 
from T1 to L5.    



   FINDINGS: Chronic T12 vertebral plana deformity is again seen, with a 
proximally 4 to 5 mm retropulsion of the vertebral body into the spinal canal. 
This results in mild spinal canal stenosis. Mild supe    



   rior endplate depression deformities of T5 and T6 are redemonstrated without 
significant loss of vertebral body height. Subtle L1 superior endplate 
compression deformity is better appreciated on compari    



   son lumbar spine CT. No interval loss of vertebral body height or change in 
spinal alignment.    



   IMPRESSION:    



   1. No interval loss of vertebral body height or change in spinal alignment. 
   



   2. Unchanged chronic T12 vertebral plana deformity with small amount of 
retropulsion into the spinal canal.    



   3. Unchanged superior endplate compression deformities of T5 and T6.    



   4. Subtle L1 superior endplate compression deformity is better appreciated 
on comparison lumbar spine CT.    



   UT SECTION: ER    



       

 

 Spine thoracic wo  EXAM: CT CERVICAL SPINE WITHOUT CONTRAST  09/10/2017  [facility]



 contrast CT  EXAM: CT THORACIC SPINE WITHOUT CONTRAST    Center



   DATE: 9/10/2017 4:21 PM CDT    



   INDICATION: Pain Post Trauma - trauma    



   COMPARISON: None    



   TECHNIQUE:  Volumetric acquisition of the cervical spine and thoracic 
without contrast. Axial, sagittal and coronal reconstructions.    



   IV contrast: None.    



   DLP: 1005 mGy-cm    



   UT SECTION: ER    



   FINDINGS: The spine is imaged from the skull base to the level of T2.    



   CERVICAL SPINE: There is mild retrolisthesis C4 over C5 which is chronic and 
is likely degenerative. Rest of the alignment is normal. No acute cervical 
spine fracture. There is mild right C3-C4 moderate    



    left C5-C6 facet arthropathy. Pre and paravertebral soft tissues are within 
normal limits. Note made of opacification of right sphenoid sinus.    



   THORACIC SPINE:    



   There is chronic severe T12 vertebral compression deformity with greater 
than 90% height loss of the vertebra centrally representing vertebra plana. 
There is retropulsion of the vertebra into the spinal    



    canal by about 5 mm causing severe spinal canal stenosis and cord 
compression.    



   There are mild chronic appearing superior endplate compression deformities 
of T5, T6 vertebral bodies without significant height loss or retropulsion. No 
other acute thoracic spinal fractures.    



   There is a subtle superior endplate compression fracture of L1 which is 
partially visualized and will be described on the lumbar spine CT. The pre and 
paravertebral soft tissues are within normal limits    



   . Multiple hypodense lesions are noted within the liver, incompletely 
evaluated and characterized. Note made of cardiomegaly and mitral annular 
calcifications.    



   IMPRESSION:    



   1.  Chronic severe T12 vertebral compression deformity, vertebra plana, with 
about 5 mm retropulsion of the vertebral body into the spinal canal causing 
severe spinal canal stenosis and cord compression.    



   2.  Chronic appearing mild superior endplate compression deformities of T5, 
T6 vertebral bodies without significant height loss or retropulsion.    



   3.  Subtle superior endplate compression fracture of L1 is partially 
visualized and will be described on the lumbar spine CT.    



   4.  Opacification of the right sphenoid sinus.    



   5.  Multiple hypodense lesions within the liver, incompletely evaluated. 
Recommend dedicated liver protocol CT/magnetic resonance imaging for further 
evaluation.    



       

 

 Spine cervical wo  EXAM: CT CERVICAL SPINE WITHOUT CONTRAST  09/10/2017  [facility]



 contrast CT  EXAM: CT THORACIC SPINE WITHOUT CONTRAST    Center



   DATE: 9/10/2017 4:21 PM CDT    



   INDICATION: Pain Post Trauma - trauma    



   COMPARISON: None    



   TECHNIQUE:  Volumetric acquisition of the cervical spine and thoracic 
without contrast. Axial, sagittal and coronal reconstructions.    



   IV contrast: None.    



   DLP: 1005 mGy-cm    



   UT SECTION: ER    



   FINDINGS: The spine is imaged from the skull base to the level of T2.    



   CERVICAL SPINE: There is mild retrolisthesis C4 over C5 which is chronic and 
is likely degenerative. Rest of the alignment is normal. No acute cervical 
spine fracture. There is mild right C3-C4 moderate    



    left C5-C6 facet arthropathy. Pre and paravertebral soft tissues are within 
normal limits. Note made of opacification of right sphenoid sinus.    



   THORACIC SPINE:    



   There is chronic severe T12 vertebral compression deformity with greater 
than 90% height loss of the vertebra centrally representing vertebra plana. 
There is retropulsion of the vertebra into the spinal    



    canal by about 5 mm causing severe spinal canal stenosis and cord 
compression.    



   There are mild chronic appearing superior endplate compression deformities 
of T5, T6 vertebral bodies without significant height loss or retropulsion. No 
other acute thoracic spinal fractures.    



   There is a subtle superior endplate compression fracture of L1 which is 
partially visualized and will be described on the lumbar spine CT. The pre and 
paravertebral soft tissues are within normal limits    



   . Multiple hypodense lesions are noted within the liver, incompletely 
evaluated and characterized. Note made of cardiomegaly and mitral annular 
calcifications.    



   IMPRESSION:    



   1.  Chronic severe T12 vertebral compression deformity, vertebra plana, with 
about 5 mm retropulsion of the vertebral body into the spinal canal causing 
severe spinal canal stenosis and cord compression.    



   2.  Chronic appearing mild superior endplate compression deformities of T5, 
T6 vertebral bodies without significant height loss or retropulsion.    



   3.  Subtle superior endplate compression fracture of L1 is partially 
visualized and will be described on the lumbar spine CT.    



   4.  Opacification of the right sphenoid sinus.    



   5.  Multiple hypodense lesions within the liver, incompletely evaluated. 
Recommend dedicated liver protocol CT/magnetic resonance imaging for further 
evaluation.    



       

 

 Brain wo contrast CT  CT HEAD WITHOUT CONTRAST  09/10/2017  [facility]



   DATE: 9/10/2017 at 5:11 PM.    Center



   COMPARISON: None.    



   HISTORY:  - s/p fall 1 week ago.    



   TECHNIQUE: Contiguous axial images of the brain were obtained without 
intravenous contrast administration. Sagittal and coronal reformats were also 
provided. DLP: 644.7 mGy-cm.    



   FINDINGS:    



   Old lacunae are noted within the left caudate head and left putamen. There 
are no acute hemorrhages or acute infarcts. The gray-white interfaces are 
otherwise well defined.    



   Low density is noted within the periventricular white matter consistent with 
chronic small vessel ischemic disease. There is prominence of the cortical sulci
, fissures, cisterns and ventricles consisten    



   t with cortical atrophy appropriate for the patient's stated age of 86 
years. A prosthetic lens is noted within the right globe.    



   There are no acute bony abnormalities. The calvarium is intact. 
Atherosclerotic calcification is noted within the walls of the bilateral 
cavernous internal carotid arteries. There is mucosal thickening    



   and soft tissue opacification of the right sphenoid sinus.    



   IMPRESSION:    



   1. No acute intracranial abnormality.    



   2. Age related volume loss.    



   3. White matter hypodensity consistent with chronic small vessel ischemic 
disease.    



   4. Right sphenoid sinusitis.    



   Resident preliminary report by Aisha Obrien: No acute intracranial 
hemorrhage. There is diffuse cerebral volume loss with chronic microvascular 
ischemic changes.    



   UT SECTION: Neuro    



       

 

 Forearm 2 views DX  EXAM: XR RIGHT FOREARM 2 VIEWS  09/10/2017  [facility]



   DATE: 9/10/2017 3:15 PM CDT    Center



   INDICATION: Pain, post fall    



   COMPARISON: None.    



   TECHNIQUE: AP and lateral radiographs of the forearm    



   UT SECTION: ER    



   FINDINGS:  There is soft tissue swelling of the distal arm/elbow/proximal 
forearm. Diffuse osteopenia is noted. Alignment of bones is normal. No acute 
fracture or malalignment.    



   IMPRESSION:    



   1. Soft tissue swelling of the distal arm/elbow/proximal forearm without 
underlying acute osseous abnormality.    



   2. Diffuse osteopenia.    



       

 

 Chest 1view DX  EXAM: XR CHEST 1 VIEW  09/10/2017  [facility]



   DATE: 9/10/2017 3:34 PM CDT    Center



       



   INDICATION: Pain, trauma    



   COMPARISON: None.    



   TECHNIQUE: AP chest    



   UT SECTION: ER    



   FINDINGS:    



   Lines, tubes and hardware: Multiple surgical clips overlie the right lateral 
hemithorax and right axilla.    



   Lungs and pleura: There is patchy bibasilar subsegmental atelectasis. No 
pleural effusion or pneumothorax.    



   Heart and mediastinum: Cardiomegaly. Tortuous aorta with aortic arch 
calcifications.   Note made of mitral annular calcification.    



   Bones: Fractures of lateral left 4-6 ribs are likely chronic. No other 
osseous abnormality.    



   IMPRESSION:    



   1.  Immediately and tortuous aorta.    



   2.  Lateral left 4th-6th rib fractures are likely chronic. However, please 
correlate with focal tenderness.    



       

 

 Shoulder series DX  EXAM: XR RIGHT SHOULDER 3 VIEWS  09/10/2017  [facility]



   DATE: 9/10/2017 3:15 PM CDT    Center



   INDICATION: Pain, post fall    



   COMPARISON: None.    



   TECHNIQUE: AP views in internal and external rotation, and an axillary view 
of the shoulder    



   UT SECTION: ER    



   FINDINGS: There is a mildly comminuted proximal right humeral fracture with 
a transverse minimally displaced and impacted fracture component extending 
through the surgical neck of the humerus and sagitt    



   al oblique fracture extending through the greater tuberosity. The greater 
tuberosity is mildly displaced laterally. A linear bony fragment is noted 
projecting medially from the surgical neck measuring a    



   bout a centimeter in length. Alignment of the glenohumeral joint is normal. 
Acromio clavicular joint is intact. There is mild right shoulder soft tissue 
swelling.  Multiple surgical clips overlie the right lateral hemithorax and 
axilla.    



   IMPRESSION:    



   1. Mildly comminuted proximal right humeral fracture with fractures through 
the surgical neck and greater tuberosity. Alignment of the glenohumeral joint 
is normal.    



       

 

 Spine-Outside Consult CT  Addendum: Note made of chronic bilateral L5 pars 
defects.  09/10/2017  [facility]



    EXAM: CT LUMBAR SPINE WITHOUT CONTRAST    Center



   DATE: 9/10/2017 3:08 PM CDT    



   INDICATION: Request for reinterpretation of outside facility study    



   COMPARISON: None    



   TECHNIQUE:  Volumetric acquisition of the lumbar spine without contrast. 
Axial, sagittal and coronal reconstructions.    



   IV contrast: None.    



   DLP:     mGy-cm    



   FINDINGS:    



   There is chronic severe T12 vertebral compression deformity resulting in 
greater than 90% height loss centrally, vertebra plana. There is retropulsion 
of the vertebral body by about 5 mm into the spinal    



    canal causing severe spinal canal stenosis and cord compression.    



   There is acute superior endplate compression fracture of L1 vertebra without 
significant height loss or retropulsion.    



   No other acute fracture.    



   Note made of mild degenerative changes of the lumbar spine with L3-L4 
degenerative disc disease resulting in mild disc height loss along with vacuum 
disc phenomenon. The pre and paravertebral soft tissues are within normal 
limits.    



   IMPRESSION:    



   1. Acute superior endplate compression fracture of L1 vertebra without 
significant height loss or retropulsion. This finding is communicated to 
emergency medicine resident Dr. Ceballos on 09/10/2017 at 1819 hours.    



   2. Chronic severe T12 vertebral compression deformity, vertebra plana with 
about 5 mm retropulsion of the vertebra into the spinal canal causing severe 
spinal canal stenosis and cord compression.    



   3. Mild degenerative changes of the lumbar spine with L3-L4 degenerative 
disc disease.    



       







Consultation Notes







 No Data Provided for This Section







Discharge Summaries







 No Data Provided for This Section







History and Physicals







 No Data Provided for This Section







Vital Signs







 Vital Sign  Value  Date  Comments  Source



         

 

 Respitory Rate  18  2017    [facility] [place] [facility]



         

 

 Systolic (mm Hg)  132  2017    [facility] [place] [facility]



         

 

 Diastolic (mm Hg)  68  2017    [facility] [place] [facility]



         

 

 Temperature Oral (F)  97.9 F  2017    [facility] [place] [facility]



         

 

 Heart Rate  79  2017    [facility] [place] [facility]



         

 

 Respitory Rate  18  2017    [facility] [place] [facility]



         

 

 Systolic (mm Hg)  114  2017    [facility] [place] [facility]



         

 

 Diastolic (mm Hg)  67  2017    [facility] [place] [facility]



         

 

 Heart Rate  86  2017    [facility] [place] [facility]



         

 

 Temperature Oral (F)  97.7 F  2017    [facility] [place] [facility]



         

 

 Temperature Oral (F)  97.7 F  2017    [facility] [place] [facility]



         

 

 Systolic (mm Hg)  116  2017    [facility] [place] [facility]



         

 

 Diastolic (mm Hg)  67  2017    [facility] [place] [facility]



         

 

 Respitory Rate  20  2017    [facility] [place] [facility]



         

 

 Heart Rate  82  2017    [facility] [place] [facility]



         

 

 BMI Calculated  14.78  2017    [facility] [place] [facility]



         

 

 Weight  33.182  2017    [facility] [place] [facility]



         

 

 Height  149.86 cm  2017    [facility] [place] [facility]



         

 

 Weight  34.091  09/10/2017    [facility] [place] [facility]



         

 

 Height  147.32 cm  2013    [facility] [place] [facility]



         

 

 Weight  43.636  2013    [facility] [place] [facility]



         







Encounters







 Location  Location  Encounter  Encounter  Reason  Attending  ADM  DC  Status  
Source



   Details  Type  Number  For  Provider  Date  Date    



         Visit          



                   



                   



                   

 

 [facility]    Outpatient  [id number]  SYNCOPE  YISSEL    Active  
[facility]  /2013    [facility]



                   



                   



                   

 

 [facility]    [facility]  [id number]    [place]  09/10  09/14    [facility]



 Rom Herndon  /2017    [facility]    Outpatient  [id number]  SYNCOPE  YISSEL      Cancel  [facility]                  Center



                   



                   







Procedures







 Procedure  Code  Date  Perfomer  Comments  Source



           



           

 

 Closure of  [id number]  1969      [facility]



 gastric ulcer          [facility]



           



           







Assessment and Plan







 Assessment and Plan  Date  Source



     

 

 Extracted from:Title: Progress Note  2017  [facility] [place] [facility]



 Author: Henrry Rubalcava MD    



 Date: 17    



  Assessment/Plan    



     



     



 85 yo with PUD, HTN, breast ca (currently in remission) p/w persistent back 
pain s/p fall. found to have severe chronic T12 compression deformity with 
severe spinal canal stenosis.    



     



 1.Compression fracture of body of thoracic vertebra    



    no surgical intervention per ortho spine. no need for brace. f/u with Dr. Webb in 1-2 weeks.    



   2.Humeral fracture    



    NWB RUE. sling for immobilization. f/u w Dr. Gamboa [phone] to schedule 
appointment on .    



   3.Severe malnutrition    



    encourage po intake. cont calcium carbonate 600 mg/vitamin D3 BID. 
ergocalciferol 50K weekly x 12 weeks. call [phone] to schedule appointment 
with [facility] with Dr. Earle kay 3-4 weeks for further osteoporosis evaluation.    



   4.HTN (hypertension)    



    pt expressing orthostatic symptoms. d/c amlodipine 10 mg daily.    



   5.GERD (gastroesophageal reflux disease)    



    cont protonix.    



   6.Acute pain    



    tylenol and tramadol prn. bm regimen.    



   7.Breast ca    



    currently in remission. will defer to OP oncologist.    



     



     



   Prophylaxis    



   lovenox    



    Disposition    



   home with home PT/OT pending PT clearance.    



     



     



 Extracted from:Title: Bone Health    



 Author: Tory Bahena    



 Date: 17    



 Attending: [place] Herndon MD        Phone: [phone]    Service: 
Internal Medicine    



 Code status: None Specified=FULL CODE    



 Reason for Admission: COMPRESSION THORACIC FX,HUMERUS FX,S/P FALL    



 Working DRG: None Documented    



 Isolation: None Documented    



 Consulting Physicians: (none on file)    



     



 CHIEF COMPLAINT: R humerus, T12, and L1 fragility fracture    



     



 HISTORY OF PRESENT ILLNESS: Ms. Stewart is an 86 year old postmenopausal 
 woman w/ PMH significant for HTN, GERd (with history of ulcer), and 
history of breast cancer who presented to the ED as a    



  transfer from an OSH in [place] on 09/10/17. The history is obtained 
from the patient, her son, and through chart review. She reportedly was at home 
and fell on Thursday, 17, when she trippe    



 d over her dog in the middle of the night. She fell onto her R shoulder and 
had immediate pain so went to the local ED in [place]. She was given a 
cuff and collar sling for the humerus fracture and    



  sent home. She then returned to the ED on 09/10/17 for persistant pain and 
was noted at that time to also have the T12 and L1 VCF so was transferred to [facility].    



 Previous history of fracture: Denies    



 Family history of fracture/osteoporosis: Denies    



 Previous DEXA: Denies    



 Prescription medication for osteoporosis/penia: Denies    



 Supplements: Denies. Per son, she has had a very poor diet over the last 
several years and has lost over 10 pounds. They have sought dietary counseling 
for her but have been unsuccessful thus far in improving this.    



 Physical Activity: Patient denies use of ambulatory aids at baseline. She has 
a caregiver that comes to the house daily to help with showering, cleaning, and 
other ADLs. She also has another caregiver t    



 hat comes once per week to help with gardening and other recreational 
activities.    



 REVIEW OF SYSTEMS:    



 CONSTITUTIONAL: No weight loss, fever, chills, weakness or fatigue.    



 HEENT: Eyes: No visual loss, blurred vision, double vision. Ears, Nose, Throat
: No hearing loss, sneezing, congestion, no sore throat.    



 SKIN: No rash or itching.    



 CARDIOVASCULAR: No chest pain, chest pressure or chest discomfort.    



 RESPIRATORY: No SOB. No coughing.    



 GASTROINTESTINAL: No loss of appetite, nausea, vomiting or diarrhea. No 
abdominal pain or bloody stools.    



 GENITOURINARY: No dysuria, hematuria, polyuria.    



 NEUROLOGICAL: No headache, dizziness, syncope, paralysis, ataxia, numbness or 
tingling in the extremities. No change in bowel or bladder control.    



 MUSCULOSKELETAL: See HPI.    



 HEMATOLOGIC: No easy bleeding or bruising.    



 LYMPHATICS: No enlarged nodes.    



 PSYCHIATRIC: No history of depression or anxiety.    



 ENDOCRINOLOGIC: No reports of sweating, cold or heat intolerance. No polyuria 
or polydipsia.    



 PAST MEDICAL HISTORY:    



 See HPI.    



 HTN (hypertension)    



 GERD (gastroesophageal reflux disease)    



 Breast cancer    



 FAMILY HISTORY:    



 See HPI.    



 No qualifying data available    



 SOCIAL HISTORY:    



 See HPI.    



 Tobacco    



 Details: Use: Never smoker.  Tobacco smoke exposure: None.  Did the Patient 
Smoke Cigarettes Anytime During the Last 365 Days? No.  Cessation Counseling 
Provided? No.    



     



 Vitals    Tmp(F)    Pulse    BP    RR    SpO2    FIO2    



  08:56    98.8    95    138/77    18    96    ---    



  03:43    96.2    89    95/60    20    96    ---    



 09/10 22:45    98.2    92    116/69    20    96    ---    



 09/10 20:03    ----    109    92/53    22    98    ---    



 09/10 19:39    ----    106    141/65    39    98    ---    



     



 24 Hr Tmax: 98.8F (37.11c) at  08:56        Vital Signs are the last 5 in 
the past 48 hours.    



 Date    Wt(kg)    Wt(lb)    Ht(cm)    Ht(in)    Method    



 09/10 (initial)     33.18      73.00    149.86     59.00    Estimated    



 PHYSICAL EXAM:    



 CONSTITUTIONAL: 86 year old  woman laying in bed in NAD. Appears of 
stated age but thin and frail. Son at bedside.    



 PSYCHOLOGICAL: Appropriate mood and affect. Alert and oriented.    



 HEENT: Normocephalic, atraumatic.    



 RESPIRATORY: Breathing is even and nonlabored on RA.    



 MUSCULOSKELETAL: No gross deformities. No involuntary movements. No sling or 
back brace in place.    



 NEUROLOGICAL: Grossly neurovascularly intact in BLE and BUE.    



     24hr Labs    



  0537    



 Glucose Lvl    90    



 BUN    25      H    



 Creatinine Lvl    0.92    



 Sodium Lvl    138    



 Potassium Lvl    3.7    



 Chloride Lvl    102    



 CO2    25    



 AGAP    14.7    



 Calcium Lvl    8.7    



 eGFR    56    



 Magnesium Lvl    2.2    



 Phosphorus    2.9    



 WBC    8.0    



 RBC    3.30      L    



 Hgb    10.1      L    



 Hct    28.8      L    



 MCV    87.3    



 MCH    30.6    



 MCHC    35.1    



 RDW    17.8      H    



 Platelet    215    



 MPV    8.3    



 Segs    71.9    



 Monocytes    7.9    



 Lymphocytes    18.4      L    



 Eosinophils    1.3    



 Basophils    0.5    



 Segs-Bands #    5.7    



 Lymphocytes #    1.5    



 Monocytes #    0.6    



 Eosinophils #    0.1    



 IMAGING:    



 Radiology Report    



 EXAM: XR RIGHT FOREARM 2 VIEWS    



 DATE: 9/10/2017 3:15 PM CDT    



 IMPRESSION:    



 1. Soft tissue swelling of the distal arm/elbow/proximal forearm without 
underlying acute osseous abnormality.    



 2. Diffuse osteopenia.    



 Radiology Report    



 EXAM: XR RIGHT SHOULDER 3 VIEWS    



 DATE: 9/10/2017 3:15 PM CDT    



 IMPRESSION:    



 1. Mildly comminuted proximal right humeral fracture with fractures through 
the surgical neck and greater tuberosity. Alignment of the glenohumeral joint 
is normal.    



 Radiology Report    



 EXAM: CT CERVICAL SPINE WITHOUT CONTRAST    



 EXAM: CT THORACIC SPINE WITHOUT CONTRAST    



 DATE: 9/10/2017 4:21 PM CDT    



 IMPRESSION:    



 1.  Chronic severe T12 vertebral compression deformity, vertebra plana, with 
about 5 mm retropulsion of the vertebral body into the spinal canal causing 
severe spinal canal stenosis and cord compression.    



 2.  Chronic appearing mild superior endplate compression deformities of T5, T6 
vertebral bodies without significant height loss or retropulsion.    



 3.  Subtle superior endplate compression fracture of L1 is partially 
visualized and will be described on the lumbar spine CT.    



 4.  Opacification of the right sphenoid sinus.    



 5.  Multiple hypodense lesions within the liver, incompletely evaluated. 
Recommend dedicated liver protocol CT/magnetic resonance imaging for further 
evaluation.    



 Radiology Report    



 EXAM: CT LUMBAR SPINE WITHOUT CONTRAST    



 DATE: 9/10/2017 3:08 PM CDT    



     



 IMPRESSION:    



 1. Acute superior endplate compression fracture of L1 vertebra without 
significant height loss or retropulsion. This finding is communicated to 
emergency medicine resident Dr. Ceballos on 09/10/2017 at 1819 hours.    



 2. Chronic severe T12 vertebral compression deformity, vertebra plana with 
about 5 mm retropulsion of the vertebra into the spinal canal causing severe 
spinal canal stenosis and cord compression.    



 3. Mild degenerative changes of the lumbar spine with L3-L4 degenerative disc 
disease.    



 ASSESSMENT:    



 1. Acute R humerus fragility fracture and L1 VCF    



 2. Chronic T12 VCF    



 3. Clinical osteoporosis: Supported by radiographic findings noted above    



 PLAN:    



 1. Labs: Vitamin D and PTH level pending.    



 2. Medication: Calcium carbonate 500mg/Vitamin D3 400 IU BID.    



 3. Pain control: Per primary.    



 4. Antibiotics: Per primary.    



 5. PT/OT: As ordered. Weightbearing status per ortho.    



 6. Education: Counseled patient and family on etiology of bone disease and 
importance of fall prevention, bone health, and appropriate treatment options. 
Educational handout given.    



 7. Discharge: Per primary. Follow up in [facility] ([phone]) or 
with PCP, Dr. Earle Martin, in [place] as outpatient 3-4 weeks after 
discharge.    



 Please call [phone] with any questions or concerns.    



 Tory Bahena PA-C    



 Fragility Fracture     



 Addendum by Tory Bahena on 2017 15:21    



 Bone Health labs reviewed:    



 1. Vitamin D deficiency: 8.9 - Will add ergocalciferol 50,000 IU weekly x 12 
weeks    



 2. Hyperparathyroidism: 86.6 - likely secondary due to vitamin D deficiency    



 Patient will need the following as outpatient for additional bone health work 
up:    



 1. DXA    



 2. Labs: BSAP, NTx, TSH, T4    



 3. Repeat Vitamin D level and PTH in 3 months    



     



 Extracted from:Title: History and Physical    



 Author: Kenn Gaviria MD    



 Date: 9/10/17    



  Assessment/Plan    



     



     



 86-year-old female with a past medical history of GERD, stomach ulcers, 
hypertension,breast cancer thought to be in remission who presents status post 
fall on Thursdayfound to have T12 compression fracture and right humeral 
fracture    



     



 1.Compression fracture of body of thoracic vertebra    



     Management as per orthopedic spine planning possible intervention    



   2.Humeral fracture    



     Management as per orthopedic surgery planning possible intervention    



   3.Severe malnutrition    



    Substance by significant weight lossand muscle wasting    



     



     



 Follow BMP for signs of refeeding have orderedrenal insurance to see if the 
patient will drink this as opposed to the other ones    



   4.HTN (hypertension)    



    Increased home Norvasc to 10 mg as needed hydralazine    



   5.GERD (gastroesophageal reflux disease)    



    Started on Protonix    



   6.Acute pain    



    Multi multiple pain regimen with Tylenol Ultram and antispasmodics    



     



     



 Bowel regimen    



   7.Breast ca    



    Thought to be in remission    



     



     



   Prophylaxis    



   Started on Lovenox    



    Disposition    



   Pending orthopedic evaluation and intervention as well as physical and 
occupational therapy evaluation    



     



     



     



     



 Hospitalist is primary please page     



     



     







Plan of Care







 No Data Provided for This Section







Social History







 Social History  Date  Source



     

 

 Social History TypeResponse  2017  [facility]



 Smoking Status    



 Never smoker; Exposure to Tobacco Smoke None; Cigarette Smoking Last 365 Days 
No; Reg Smoking Cessation Counseling No    



     







Family History







 No Data Provided for This Section







Advance Directives







 No Data Provided for This Section







Functional Status







 No Data Provided for This Section What Is The Reason For Today's Visit?: Full Body Skin Examination What Is The Reason For Today's Visit? (Being Monitored For X): concerning skin lesions on an annual basis